# Patient Record
Sex: FEMALE | Race: WHITE | Employment: FULL TIME | URBAN - METROPOLITAN AREA
[De-identification: names, ages, dates, MRNs, and addresses within clinical notes are randomized per-mention and may not be internally consistent; named-entity substitution may affect disease eponyms.]

---

## 2020-04-02 ENCOUNTER — HOSPITAL ENCOUNTER (OUTPATIENT)
Dept: GENERAL RADIOLOGY | Age: 35
Discharge: HOME | End: 2020-04-02
Attending: FAMILY MEDICINE
Payer: COMMERCIAL

## 2020-04-02 DIAGNOSIS — R05.9 COUGH: ICD-10-CM

## 2020-04-02 DIAGNOSIS — R06.02 SHORTNESS OF BREATH: ICD-10-CM

## 2020-04-02 PROCEDURE — 71046 X-RAY EXAM CHEST 2 VIEWS: CPT

## 2022-01-11 ENCOUNTER — HOSPITAL ENCOUNTER (OUTPATIENT)
Dept: PHYSICAL THERAPY | Age: 37
Discharge: HOME | End: 2022-01-11
Payer: COMMERCIAL

## 2022-01-11 VITALS — HEIGHT: 67 IN | WEIGHT: 264 LBS | BODY MASS INDEX: 41.44 KG/M2

## 2022-01-11 PROCEDURE — 97161 PT EVAL LOW COMPLEX 20 MIN: CPT

## 2022-01-11 PROCEDURE — 97110 THERAPEUTIC EXERCISES: CPT

## 2022-01-11 PROCEDURE — 97140 MANUAL THERAPY 1/> REGIONS: CPT

## 2022-01-11 NOTE — PROGRESS NOTES
[facility]  Lymphedema Clinic  [address] [facility], [address] [place] [address], [facility] 22.    INITIAL EVALUATION    NAME: Carlie Donahue AGE: 39 y.o. GENDER: female  DATE: 1/11/2022  REFERRING PHYSICIAN: Frida Grey NP  HISTORY AND BACKGROUND:   Primary Diagnosis:  · Primary lymphedema (Q82.0)  Other Treatment Diagnoses:   Swelling not relieved by elevation (R60.9)   Morbid obesity (E66.01)  Date of Onset: 10/21/2021  Present Symptoms and Functional Limitations: Patient reports onset of left foot and ankle swelling after left foot injury in ( stepped the wrong way) in 2019. Patient reports she went to therapy and pain went away but swelling remained unchanged. She then developed significantly increased swelling with pregnancy in 5671-1410. Both legs swelled at that time but the left was dramatically worse than the right. She reports that the swelling was so bad that she had to wear a Men's sandal on her left foot. Patient delivered her daughter about 8 months ago and reports swelling improved after that but left leg has remained swollen especially in the foot and ankle area. Patient reports she experiences heaviness and aching pain in her left lower leg that is constant. She reports her father has a history of leg swelling with wounds and recurrent cellulitis and she has a [employer] on her mother's side who used to wear compression garments. Patient tried self purchased compression stockings in the past but it was painful and may not have fit her well. She is not currently utilizing any compression products. Lymphedema Life Impact Scale (LLIS):  Scores 33 out 64 of with 49% impairment.   Past Medical History: Sjogren's disease   Past Medical History:   Diagnosis Date    ADD (attention deficit disorder)      Past Surgical History:   Procedure Laterality Date    HX WISDOM TEETH EXTRACTION       Current Medications:  Patient reports she is taking no medications currently. She takes Advil as needed for pain relief. Current Outpatient Medications   Medication Sig    Cetirizine (ZYRTEC) 10 mg Cap Take  by mouth daily.  triamcinolone (NASACORT AQ) 55 mcg nasal inhaler 2 Sprays by Both Nostrils route daily.  naproxen sodium (ALEVE) 220 mg Cap Take  by mouth. 2 daily prn for back pain     ibuprofen (MOTRIN) 200 mg tablet Take  by mouth. 4 daily prn for headaches     multivitamin (ONE A DAY) tablet Take 1 Tab by mouth daily.  meclizine (ANTIVERT) 25 mg tablet Take 1 Tab by mouth three (3) times daily as needed for Dizziness.  fluticasone (FLONASE) 50 mcg/Actuation nasal spray 2 Sprays by Nasal route daily. 1 spray in each nostril. No current facility-administered medications for this encounter. Allergies:  Cat dander, dust mites and seasonal allergies    Prior Level of Function/Social/Work History/Personal factors and/or comorbidities impacting plan of care: Patient works full time as a . . She is  and they have an 7 month old daughter Maru Whitehead. Patient walks 20 minutes a day with her daughter. Living Situation:Multi-level home    Trainable Caregiver?: Yes, wife if available  Mobility: Independent gait without any assistive device for community distances  Sleeping Arrangement:  in bed  Adaptive Equipment Owned: none     Previous Therapy:  Patient had traditional physical therapy for left foot/ankle injury in the past.  No previous lymphedema treatment is noted. Compression/Lymphedema Equipment: None currently    SUBJECTIVE: I injured my foot in 2019 and I had swelling then, but it never improved with therapy. The rest of the injury got better but the swelling was still present. When I was pregnant I had a lot of leg swelling but my left leg was dramatically worse than right. After I had Maru Whitehead, the swelling did improve but the left leg stays swollen especially in my foot. Patients goals for therapy:   To be able to wear shoes again, to have less discomfort and to be able to manage the swelling  OBJECTIVE DATA SUMMARY:   EXAMINATION/PRESENTATION/DECISION MAKING:     Pain:  Pain Scale 1: Numeric (0 - 10)  Pain Intensity 1: 6  Pain Location 1: Foot;Leg  Pain Orientation 1: Left  Pain Description 1: Aching;Heaviness  Patient Stated Pain Goal: 0    Self-Care and ADLs: Independent    Skin and Tissue Assessment:  Dermal Status: Intact, Dry and Flaky    Texture/Consistency: Boggy with mild brawniness to dorsum of left foot    Pigmentation/Color Change: Normal    Anomalies: N/A    Circulatory: Pulses    Nails: Normal    Stemmers Sign: Positive left leg    Height:  Height: 5' 7\" (170.2 cm)  Weight:  Weight: 119.7 kg (264 lb)   BMI:  BMI (calculated): 41.3  (36 or greater: adversely affecting lymphedema)  Wound/Ulcer:  N/A        Volumetric Measurements:   Right:  13,745.46mL Left:  14,123.73mL   % Difference: 2.75% left larger than right Dominance: not assessed   (See scanned graph)    Range of Motion: [facility] for bilateral lower extremities  Strength: Not formally assessed 2* patient is able to complete all functional activities in the clinic today. Sensation:  Intact     Mobility:    Bed/Chair Mobility: Independent  Transfers: Independent  Gait: Independent  Endurance: good    Safety:  Patient is alert and oriented: yes  Safety awareness: good  Fall risk?: low  Patient given written fall prevention handout: Yes    Based on the above components, the patient evaluation is determined to be of the following complexity level: LOW     Evaluation Time:  5196-2091 35 minutes    TREATMENT PROVIDED:   1. Treatment description:  Therapeutic Exercise and Procedure: Patient instructed in activity restrictions and exercise guidelines. Therapist demonstrated deep abdominal breathing and a remedial lymphedema range of motion exercise program to be done in sitting.   Patient was able to complete the routine times 5 reps and deep abdominal breathing with good performance. Patient has been asked to complete breathing exercises and the decongestive exercise routine daily. Patient does participate in a walking or  program almost daily and she is encouraged to try to walk 30 minutes a day. Treatment time:  1515-9163 Minutes: 10    2. Treatment description:  Manual Therapy: Patient instructed in skin care principles and anatomy of the lymphatic system. Therapist able to educate patient in the basic concepts of manual lymphatic drainage techniques. Educated patient in skin care with low pH lotion and educated patient how to care for skin injuries to promote healing and prevent infection. Her skin is dry and she is using a lotion daily but she will check it to see if dimethicone is present. Educated patient in the signs and symptoms of cellulitis and to contact her physician if symptoms are present. Educated patient in complete decongestive therapy. Discussed treatment components and goals with patient and the role of multi-layer compression bandaging to decrease swelling and compression garments to manage swelling over time. Discussed the availability of day and night compression garments. Patient would like to try and learn multi-layer compression bandaging for long term management and is agreeable to initiate treatment later this week. She is educated in the need to purchase bandaging supplies and to obtain a shoe 2 sizes larger than her current shoe size. Bandaging order will be sent to [employer]. Demonstrated bandaging components for patient. Discussed foot wear with patient and encouraged her to wear sneakers ( which she is wearing today) or a full coverage shoe for her foot. Discussed kinesiotape use with patient and the possibly application on her left foot to help address foot swelling.   Patient was agreeable to try this option so therapist applied skin barrier followed a sample piece of kinesiotape to medial aspect of left forearm and educated patient in how to monitor for adverse skin reactions. If an adverse reaction occurs she is to remove the tape, if none is noted she is to keep the tape on until tomorrow and removed the tape by applying vegetable oil and allowing it to soak in before tape is removed. She is to then again monitor for adverse skin reactions. Patient verbalized understanding. Treatment time:  6405-9670  Minutes: 48    ASSESSMENT:   Todd Hays is a 39 y.o. female who presents with stage 2 lower extremity lymphedema of left lower extremity and to a lesser extent the right lower extremity and her lymphedema presents as a primary in nature. Pain is present in the left lower extremity and tissue texture changes are present in the left foot. Patient is motivated to improve her condition. Patient's condition is complicated by Sjogren's disease. Patient will benefit from complete decongestive therapy (CDT) including: Manual lymphatic drainage (MLD) technique, Short-stretch textile bandages/compression system to decongest limb and Kinesiotaping as appropriate. This care is medically necessary due to the infection risk with lymphedema and to improve functional activities. CDT is necessary to resolve swelling to allow patient to return to wearing normal clothes/footwear and prevent worsening of symptoms, such as infections and/or hospitalizations. Patient will be independent with home program strategies to allow improved ADL ability and mobility and to allow patient to return to greatest functional independence. Rehabilitation potential is considered to be Good. Factors which may influence rehabilitation potential include patient's age and she is eager to learn how to manage and control this condition. Patient will benefit from 10-12 physical therapy visits over 10-12 weeks to optimize improvement in these areas.     PLAN OF CARE:   Recommendations and Planned Interventions: Manual lymph drainage/decongestive therapy, Multi-layer compression bandaging (short-stretch), Compression garment fitting/provision, Lymphedema therapeutic exercise, Self-care training, Functional mobility training, Education in skin care and lymphedema precautions, Self-MLD education per home program, Self-bandaging education per home program and Caregiver education as needed    GOALS  Short term goals  Time frame: 2/15/2022  1. Patient will demonstrate knowledge of signs/symptoms of infections/cellulitis and be independent in skin care to prevent cellulitis. 2.  Patient will demonstrate independence in lymphedema home program of therapeutic exercises to improve circulation and decongest limb to improve ADLs. 3.  Patient will tolerate multi-layer bandages (MLB) and show measureable decrease in limb volume to allow ordering of home compression system (daytime, nighttime garments and pump as needed). Long term goals  Time frame: 4/7/2022  1. Pt will show improvement in the Lymphedema Life Impact Scale (LLIS) by decreasing the score to 40% and thus allow improvement in patient's quality of life. 2.  Patient will be independent with don/doff of compression system and use in order to prevent reaccumulation of fluid at discharge. 3.  Pt will be independent in self-MLD and show stable limb volumes showing decongestion and pt. ready for transition to independent restorative phase of lymphedema therapy. Patient has participated in goal setting and agrees to work toward plan of care. Patient was instructed to call if questions or concerns arise. Thank you for this referral.   Veronica Alexis, MSPT, CLT-MIMI           Total timed codes: 60  1:1 Treatment time: 60    TREATMENT PLAN EFFECTIVE DATES:   1/11/2022 TO 4/7/2022  I have read the above plan of care for Zheng Harris. I certify the above prescribed services are required by this patient and are medically necessary.   The above plan of care has been developed in conjunction with the lymphedema/physical therapist.       Physician Signature: ____________________________________Date:______________                                           Adriane Valero NP

## 2022-01-14 ENCOUNTER — HOSPITAL ENCOUNTER (OUTPATIENT)
Dept: PHYSICAL THERAPY | Age: 37
Discharge: HOME | End: 2022-01-14
Payer: COMMERCIAL

## 2022-01-14 PROCEDURE — 97140 MANUAL THERAPY 1/> REGIONS: CPT

## 2022-01-14 NOTE — PROGRESS NOTES
LYMPHEDEMA PT DAILY TREATMENT NOTE - [facility] 2-15    Patient Name: Sangeeta Hill  Date:2022  : 1985  [x]  Patient  Verified  Payor: Kareem Liz / Plan:  [address] / Product Type: HMO /    In time:8:45am Out time:10:15am  Total Treatment Time (min): 90  Total Timed Codes (min): 90  1:1 Treatment Time ( [address] only): 90  Visit #:  2    Treatment Area: Lymphedema, not elsewhere classified [I89.0]    SUBJECTIVE  Pain Level (0-10 scale): 0/10  Any medication changes, allergies to medications, adverse drug reactions, diagnosis change, or new procedure performed?: [x] No    [] Yes (see summary sheet for update)  Subjective functional status/changes:   [x] No changes reported  Patient reports that she is ready to begin bandaging today. Patient brought in larger shoes, but they were not wide enough for bandage so a cast shoe was utilized today. OBJECTIVE  90 min Manual Therapy    Kinesiotaping: Patient education provided on proper removal of kinesiotape. Patient did not have reaction to test strip from evaluation. Today able to place oval piece of tape at the base of toes on the dorsum of the left foot. Manual Lymphatic Drainage (MLD):  Area to decongest: LE: bilateral   Sequence used and effectiveness: MLD principles with skin care today. Session today focused on MLB and having patient be able to demonstrate self MLB. Skin/wound care/debridement: Reviewed skin care principles:   Patient's extremity bathed with soap and water. Performed skin care with low pH lotion following manual lymph principles. Skin care products  Hygiene  Prevention of cellulitis    Applied multi-layer compression bandaging to: Patient ready to learn how to bandage today.    left  lower extremity: below knee    The following multi-layer bandages were applied:  Tricofix      Padding layer:  Fleece    Foam:  12 cm roll    Short stretch bandages:   8 cm  10 cm  12 cm     Upper/Lower Extremity Compression:   Will assess once swelling is stable. Will work on getting insurance benefit information from vendor for next visit. Rationale: decrease edema  to improve the patients ability to prevent worsening of swelling over time. With   [] TE   [] TA   [x] MT   [] SC   [x] other: Patient Education: [x] Review HEP    [x] MLD Patient Education Continued education in self MLD technique with bathing and skin care. [x] Progressed/Changed HEP based on:   [x] positioning   [x] Kinesiotape   [x] Skin care   [] wound care   [] other:   [x] Patient/caregiver in multi-layer bandaging donning principles. , Precautions., Supply ordering and types of bandaging. , Handout provided. and Further review required. Patient / caregiver re-demonstrated bandaging. [x] Yes  [] No  Compression bandaging/garment precautions reviewed: [x] Yes  [] No     Other Objective/Functional Measures: Will reassess on next visit. Pain Level (0-10 scale) post treatment: 0/10    Weight: 265.6 lbs    ASSESSMENT/Changes in Function:   Patient very eager to learn how to better manage her swelling able was able to begin bandaging today. Patient will work will maintain MLB over the weekend and then reapply MLB as tolerated in the home setting as she was able to demonstrate today that she is able to self bandage adequately. Patient will return for follow up and call the clinic if she has any questions prior to that visit. Patient should progress quickly with treatment and will work on modifying MLB and beginning full MLD next session. Patient will continue to benefit from skilled PT services to  address swelling, instruct in home lymphedema management program, measure for compression products and modify and progress therapeutic interventions to attain remaining goals.      [x]  See Plan of Care  []  See progress note/recertification  []  See Discharge Summary         Progress towards goals / Updated goals:  GOALS  Short term goals  Time frame: 2/15/2022  1. Patient will demonstrate knowledge of signs/symptoms of infections/cellulitis and be independent in skin care to prevent cellulitis. Progressing towards goal.  2.  Patient will demonstrate independence in lymphedema home program of therapeutic exercises to improve circulation and decongest limb to improve ADLs. Progressing towards goal.  3.  Patient will tolerate multi-layer bandages (MLB) and show measureable decrease in limb volume to allow ordering of home compression system (daytime, nighttime garments and pump as needed). Progressing towards goal.     Long term goals  Time frame: 4/7/2022  1. Pt will show improvement in the Lymphedema Life Impact Scale (LLIS) by decreasing the score to 40% and thus allow improvement in patient's quality of life. 2.  Patient will be independent with don/doff of compression system and use in order to prevent reaccumulation of fluid at discharge. 3.  Pt will be independent in self-MLD and show stable limb volumes showing decongestion and pt. ready for transition to independent restorative phase of lymphedema therapy.     PLAN  []  Upgrade activities as tolerated     [x]  Continue plan of care  []  Update interventions per flow sheet       []  Discharge due to:_  []  Other:_      Ander HEART Patch, PTA, CLT 1/14/2022

## 2022-01-24 ENCOUNTER — APPOINTMENT (OUTPATIENT)
Dept: PHYSICAL THERAPY | Age: 37
End: 2022-01-24
Payer: COMMERCIAL

## 2022-02-02 ENCOUNTER — HOSPITAL ENCOUNTER (OUTPATIENT)
Dept: PHYSICAL THERAPY | Age: 37
Discharge: HOME | End: 2022-02-02
Payer: COMMERCIAL

## 2022-02-02 PROCEDURE — 97140 MANUAL THERAPY 1/> REGIONS: CPT

## 2022-02-02 NOTE — PROGRESS NOTES
LYMPHEDEMA PT DAILY TREATMENT NOTE - [facility] 2-15    Patient Name: You Conde  Date:2022  : 1985  [x]  Patient  Verified  Payor: Emerson Brody / Plan: Marlyn Daily / Product Type: HMO /    In time: 9:15am Out time:10:30am  Total Treatment Time (min): 75  Total Timed Codes (min): 75  1:1 Treatment Time ( [address] only): 75  Visit #:  3    Treatment Area: Lymphedema, not elsewhere classified [I89.0]    SUBJECTIVE  Pain Level (0-10 scale): 0/10  Any medication changes, allergies to medications, adverse drug reactions, diagnosis change, or new procedure performed?: [] No    [x] Yes   Contact Dermatitis after last visit. Resolved at this time. Subjective functional status/changes:   [x] No changes reported  Patient reports that she had issues with itchy and burning skin after her first bandage. She went to PCP and was given ointment for contact dermatitis. Patient now with out issues and able to return today after missing last visit due to Covid exposure and awaiting negative Covid test. Patient reports that her L LE swelling improved with last bandage and she was pleased with how her leg looked. She worked on bandaging the R LE in the home setting some due to the issues she had with her skin on the left leg. No concerns with skin integrity today. Patient reports that she needed to leave early for a work meeting so treatment time was shortened to accommodate patient's needs. OBJECTIVE  90 min Manual Therapy    Kinesiotaping: Patient education provided on proper removal of kinesiotape. Patient did not have a reaction to the tape at the base of toes last visit. Today able to apply 2 \"Y\" strips from base of 1st and 2nd toe to above ankle laterally and one from base of 3rd and 4th toes past the ankle medically.       Manual Lymphatic Drainage (MLD):  Area to decongest: LE: bilateral   Sequence used and effectiveness: Secondary sequence for lower extremities with trunk involvement. Patient able to be issued Self MLD packet and able to demonstrate techniques. Patient will begin following handout in the home setting. Skin/wound care/debridement: Reviewed skin care principles:   Performed skin care with low pH lotion following manual lymph principles. Skin care products  Hygiene  Prevention of cellulitis   Applied Aquaphor mixed with Eucerin today prior to [facility] application. Applied multi-layer compression bandaging to:    left  lower extremity: below knee    The following multi-layer bandages were applied:  Tg soft  Transelast (1st and 2nd toes)    Padding layer:  Fleece    Foam:  12 cm roll    Short stretch bandages:   8 cm  10 cm  12 cm    Cast shoe for the left foot. Patient aware to be cautious with gait. Patient aware also to remove MLB if she is having any concerns and to monitor her skin. Upper/Lower Extremity Compression:   Will assess once swelling is stable. Vendor returned insurance benefits and there is no coverage for compression garments. Patient made aware of this and given rough estimates of pricing for garments so that she would be prepared when it was time to order compression garments. Rationale: decrease edema  to improve the patients ability to prevent worsening of swelling over time. With   [x] TE   [] TA   [x] MT   [] SC   [x] other: Patient Education: [x] Review HEP    [x] MLD Patient Education Continued education in self MLD technique with bathing and skin care. [x] Progressed/Changed HEP based on:   [x] positioning   [x] Kinesiotape   [x] Skin care   [] wound care   [] other:   [x] Patient/caregiver in multi-layer bandaging donning principles. , Precautions., Supply ordering and types of bandaging. , Handout provided. and Further review required. Patient / caregiver re-demonstrated bandaging.  [x] Yes  [] No  Compression bandaging/garment precautions reviewed: [x] Yes  [] No     Other Objective/Functional Measures: Reviewed results of volumetric measurements taken on evaluation. -left lower 13,394.52 ml today compared to 14,123.73 ml on 1/11/2022.     -right lower  13,924.76 ml today compared to 13,745.46 ml on 1/11/2022. Percent difference today is -3.81% as compared to 2.75% on evaluation. Noted that full volumes do not take into account patient's foot swelling that currently is of most concern. Will continue to monitor as there was very little change from evaluation girths. Pain Level (0-10 scale) post treatment: 0/10    ASSESSMENT/Changes in Function:   Patient able to return today for the first time since 1/14/2022 for follow up. Patient has made some gains in reduction of swelling bilaterally, but continues to have swelling in the L foot that is most bothersome for patient. Patient did have contact dermatitis after last visit so will need to continue to modify and monitor skin carefully. Able to add Self MLD and exercises to her home program and patient to return for follow up bandaged so we can assess progress. Patient making gains towards goals and is eager to learn how to improve her lymphedema. Will set up a vasopneumatic device demonstration/treatment on an upcoming visit. Patient will continue to benefit from skilled PT services to address swelling, instruct in home lymphedema management program, measure for compression products and modify and progress therapeutic interventions to attain remaining goals. [x]  See Plan of Care  []  See progress note/recertification  []  See Discharge Summary         Progress towards goals / Updated goals:  GOALS  Short term goals  Time frame: 2/15/2022  1. Patient will demonstrate knowledge of signs/symptoms of infections/cellulitis and be independent in skin care to prevent cellulitis.  Progressing towards goal.  2.  Patient will demonstrate independence in lymphedema home program of therapeutic exercises to improve circulation and decongest limb to improve ADLs. Progressing towards goal.  3.  Patient will tolerate multi-layer bandages (MLB) and show measureable decrease in limb volume to allow ordering of home compression system (daytime, nighttime garments and pump as needed). Progressing towards goal.     Long term goals  Time frame: 4/7/2022  1. Pt will show improvement in the Lymphedema Life Impact Scale (LLIS) by decreasing the score to 40% and thus allow improvement in patient's quality of life. 2.  Patient will be independent with don/doff of compression system and use in order to prevent reaccumulation of fluid at discharge. 3.  Pt will be independent in self-MLD and show stable limb volumes showing decongestion and pt. ready for transition to independent restorative phase of lymphedema therapy.  Progressing towards goal.      PLAN  []  Upgrade activities as tolerated     [x]  Continue plan of care  []  Update interventions per flow sheet       []  Discharge due to:_  []  Other:_      Ander HEART Patch, PTA, CLT 2/2/2022

## 2022-02-08 ENCOUNTER — APPOINTMENT (OUTPATIENT)
Dept: PHYSICAL THERAPY | Age: 37
End: 2022-02-08
Payer: COMMERCIAL

## 2022-02-16 ENCOUNTER — HOSPITAL ENCOUNTER (OUTPATIENT)
Dept: PHYSICAL THERAPY | Age: 37
End: 2022-02-16
Payer: COMMERCIAL

## 2022-02-28 ENCOUNTER — HOSPITAL ENCOUNTER (OUTPATIENT)
Dept: PHYSICAL THERAPY | Age: 37
Discharge: HOME | End: 2022-02-28
Payer: COMMERCIAL

## 2022-02-28 PROCEDURE — 97016 VASOPNEUMATIC DEVICE THERAPY: CPT

## 2022-02-28 PROCEDURE — 97140 MANUAL THERAPY 1/> REGIONS: CPT

## 2022-02-28 NOTE — PROGRESS NOTES
LYMPHEDEMA PT DAILY TREATMENT NOTE - [facility] 2-15  30 Day Reassessment   Patient Name: Lázaro Cruz  Date:2022  : 1985  [x]  Patient  Verified  Payor: Rosalina Ga / Plan: Jean Alberto / Product Type: HMO /    In time: 9:15am Out time:10:50am  Total Treatment Time (min): 95  Total Timed Codes (min): 95  1:1 Treatment Time ( [address] only): 95  Visit #:  4    Treatment Area: Lymphedema, not elsewhere classified [I89.0]    SUBJECTIVE  Pain Level (0-10 scale): 0/10  Any medication changes, allergies to medications, adverse drug reactions, diagnosis change, or new procedure performed?: [] No    [x] Yes   See below in subjective section. Subjective functional status/changes:   [x] No changes reported  Patient returned today for the first time since 2022. Patient cancelled two visits on 2022 and 2022. Patient reports since her last visit she has also started outpatient PT for her R knee. She reports that the therapist working on R knee also was working on scar release of her  scar and a portion of the scar opened. Patient had it assessed by her MD and section was glued back and is intact and patient reports no restrictions from her physician. Patient reports that she is eager to to proceed with ordering compression for her L LE today and will then begin bandaging the R LE regularly as she has noticed ncreased swelling on the R LE. Patient reports that she is pleased with her progress since beginning care in the lymphedema clinic. OBJECTIVE  50 min Manual Therapy    Kinesiotaping: Deferred today. Manual Lymphatic Drainage (MLD):  Area to decongest: LE: bilateral   Sequence used and effectiveness: Modifications were made to manual lymph drainage sequence to exclude cervical techniques secondary to patient's age. Secondary sequence for lower extremities with trunk involvement. Patient issued Self MLD packet and able to demonstrate techniques.    Patient performing self MLD in the home setting. Skin/wound care/debridement: Reviewed skin care principles:   Performed skin care with low pH lotion following manual lymph principles. Skin care products  Hygiene  Prevention of cellulitis   Applied Aquaphor mixed with Eucerin today prior to [facility] application. Applied multi-layer compression bandaging to:    left  lower extremity: below knee    The following multi-layer bandages were applied:  Tg soft    Padding layer:  Fleece    Foam:  12 cm roll    Short stretch bandages:   8 cm  10 cm  12 cm    Cast shoe for the left foot. Patient aware to be cautious with gait. Patient aware also to remove MLB if she is having any concerns and to monitor her skin. Patient has a second set of bandages and may begin bandaging the R LE at night as well to begin reduction in order to order garments for her R LE on an upcoming visit, once she receives her L LE garments and can bandage her R LE more consistently. Upper/Lower Extremity Compression:    Measured for the following compression products:    LE: bilateral lower extremity (L LE): Knee high Val Other: Bioflect    Style: Flat-knit    Brand: [employer]  Biaflect    Type: Custom: Elvarex knee high for the left Non-Custom: Size: 2XL     Vendor: [employer]    Education: Educated patient in compression garment donning and doffing. Glove use with donning. Daily wear schedule. Daily laundering. Garment lifespan. Return and reordering process (by bringing prior garments into the clinic). Educated patient to monitor for redness or pressure points on the skin. If new pain occurs they should contact their therapist.  Patient educated in how to don/doff products so that she can work with them when they arrive. Rationale: decrease edema  to improve the patients ability to prevent worsening of swelling over time.       45   Vasopneumatic Device:   CONSERVATIVE TREATMENT:  Patient has tried conservative treatments; Compression bandaging, exercise and elevation :  [x] Yes [] No    If YES, specify type of conservative treatment and duration of use:  Compression garments:      [] <4 weeks   [x] 1-5 months   [] 6-12 months  [] >1 year  Bandaging:     [] <4 weeks   [x] 1-5 months   [] 6-12 months  [] >1 year  Elevation:       [] <4 weeks   [] 1-5 months   [x] 6-12 months  [] >1 year   Exercise:         [] <4 weeks   [x] 1-5 months   [] 6-12 months  [] >1 year   Complete Decongestive Therapy (CDT) [] <4 weeks   [x] 1-5 months   [] 6-12 months  [] >1 year   Instructed on self-manual lymphatic drainage techniques (self-MLD): [x] Yes [] No  Instructed on appropriate skin/nail care practices:    [x] Yes [] No  Evaluation of diet and medications:      [x] Yes [] No  Patient able to have a demonstration today of the Flexitouch Plus device for trunk and B LEs today. Patient responded will to the demonstration/treatment with improved comfort post session and noted reduction in girths. Patient would benefit from this device in the home setting for use during the restorative phase of care to prevent worsening of her condition. MEASUREMENTS:  Affected Side: B LEs (Pre and Post)   Left (cm) Right (cm)   5th Tuberosity 26  26.4 25.6  25.2   Ankle 25.6  25.5 24.8  24.6   Calf 46.4  45.8 48.8  46.5   Mid Knee 46.5  46.4 49.4  47.5   Thigh 82.5  72.2 63  63.5   Groin 75  75.5 75  76.5     Hips  130  128.4   Waist  129  123.4      Rationale: To improve lymphatic fluid movement and decrease swelling to improve the patients ability to perform ADL and IADL skills and prevent worsening of swelling over time. With   [] TE   [] TA   [x] MT   [] SC   [x] other: Patient Education: [x] Review HEP    [x] MLD Patient Education Continued education in self MLD technique with bathing and skin care.    [x] Progressed/Changed HEP based on:   [x] positioning   [x] Kinesiotape   [x] Skin care   [] wound care   [x] other: Vasopneumatic devices [x] Patient/caregiver in multi-layer bandaging donning principles. , Precautions., Supply ordering and types of bandaging. , Handout provided. and Further review required. Patient / caregiver re-demonstrated bandaging. [x] Yes  [] No  Compression bandaging/garment precautions reviewed: [x] Yes  [] No     Other Objective/Functional Measures:   See pre and post vasopneumatic pump treatment girths above. Height: 5'7\" Weight: 265.6 lbs BMI:41.6    Pain Level (0-10 scale) post treatment: 0/10    ASSESSMENT/Changes in Function:   Patient returned today for the first time since 2/2/2022. Patient has cancelled her last two visits since 2/2/2022. Patient has had issues with her R knee requiring outpatient PT to address and works full time so it has been hard for her to come in regularly as hoped. Patient is making progress, but today is just her 4th visit. Patient today was able to meet short term goal #1. All other short term goals are in progress as well as her long term goals. Patient able to be measured for L LE custom knee high and a compression chris garment. Patient plans to work on beginning bandaging at night for B LEs and then order R LE garment on upcoming visit. Patient to follow up when she receives her compression products and come in for assessment of fit and for continued education in her home program.    Patient will continue to benefit from skilled PT services to address swelling, analyze compression product fit and use, instruct in home lymphedema management program, measure for compression products and modify and progress therapeutic interventions to attain remaining goals. [x]  See Plan of Care  []  See progress note/recertification  []  See Discharge Summary         Progress towards goals / Updated goals:  GOALS  Short term goals  Time frame: 2/15/2022  1. Patient will demonstrate knowledge of signs/symptoms of infections/cellulitis and be independent in skin care to prevent cellulitis.  Goal Met 2/28/2022  2. Patient will demonstrate independence in lymphedema home program of therapeutic exercises to improve circulation and decongest limb to improve ADLs. Progressing towards goal. Will extend to 4/7/2022  3. Patient will tolerate multi-layer bandages (MLB) and show measureable decrease in limb volume to allow ordering of home compression system (daytime, nighttime garments and pump as needed). Patient able to tolerate MLB and eager to be measured today for L LE garments. Will extend goal to address R LE and make sure that all needed products are ordered. Long term goals  Time frame: 4/7/2022   1. Pt will show improvement in the Lymphedema Life Impact Scale (LLIS) by decreasing the score to 40% and thus allow improvement in patient's quality of life. Goal in progress. 2.  Patient will be independent with don/doff of compression system and use in order to prevent reaccumulation of fluid at discharge. Will assess when garments arrive. 3.  Pt will be independent in self-MLD and show stable limb volumes showing decongestion and pt. ready for transition to independent restorative phase of lymphedema therapy.  Progressing towards goal.      PLAN  []  Upgrade activities as tolerated     [x]  Continue plan of care  []  Update interventions per flow sheet       []  Discharge due to:_  []  Other:_      Ander Zabala, VIANEY, CLT 2/28/2022   ANNITA SultanaT, CLMIKHAIL

## 2022-03-07 VITALS — WEIGHT: 265.6 LBS | BODY MASS INDEX: 41.69 KG/M2 | HEIGHT: 67 IN

## 2022-03-22 ENCOUNTER — HOSPITAL ENCOUNTER (OUTPATIENT)
Dept: PHYSICAL THERAPY | Age: 37
Discharge: HOME | End: 2022-03-22
Payer: COMMERCIAL

## 2022-03-22 PROCEDURE — 97140 MANUAL THERAPY 1/> REGIONS: CPT

## 2022-03-22 NOTE — PROGRESS NOTES
LYMPHEDEMA PT DAILY TREATMENT NOTE - [facility] -15  Patient Name: Cherry Butler  Date:3/22/2022  : 1985  [x]  Patient  Verified  Payor: Linda Feldman / Plan: Tara Tavares / Product Type: HMO /    In time:2:20 pm Out time:3:15pm  Total Treatment Time (min): 55  Total Timed Codes (min): 55  1:1 Treatment Time ([facility] only): 54  Visit #:  5    Treatment Area: Lymphedema, not elsewhere classified [I89.0]    SUBJECTIVE  Pain Level (0-10 scale): 0/10  Any medication changes, allergies to medications, adverse drug reactions, diagnosis change, or new procedure performed?: [] No    [x] Yes   See below in subjective section. Subjective functional status/changes:   [x] No changes reported    Patient received her L LE custom knee high and her compression chris. Patient reports that she continues to go to outpatient PT for orthopedic issues R knee and back. Patient reports that she would like to work with her products that were fitted today and return to measure for the R LE garments and additional products at her next visit. OBJECTIVE  55 min Manual Therapy    Kinesiotaping: Deferred today. Manual Lymphatic Drainage (MLD):  Area to decongest: LE: bilateral   Sequence used and effectiveness: Modifications were made to manual lymph drainage sequence to exclude cervical techniques secondary to patient's age. Secondary sequence for lower extremities with trunk involvement. Patient issued Self MLD packet and able to demonstrate techniques. Patient performing self MLD in the home setting. Skin/wound care/debridement: Reviewed skin care principles:   Performed skin care with low pH lotion following manual lymph principles. Skin care products  Hygiene  Prevention of cellulitis    Applied multi-layer compression bandaging to: Encouraged continued bandaging in the home setting in the evenings. Patient has all supplies.     Upper/Lower Extremity Compression:   Fitted for the following compression product today. LE: bilateral lower extremity (L LE): Knee high Val Other: Bioflect    Style: Flat-knit    Brand: [employer]  Biaflect    Type: Custom: Elvarex knee high for the left Non-Custom: Size: 2XL     Vendor: [employer]    Education: Educated patient in compression garment donning and doffing. Glove use with donning. Daily wear schedule. Daily laundering. Garment lifespan. Return and reordering process (by bringing prior garments into the clinic). Educated patient to monitor for redness or pressure points on the skin. If new pain occurs they should contact their therapist.  Patient independent with don/doff of her new compression in the clinic today. Rationale: decrease edema  to improve the patients ability to prevent worsening of swelling over time. With   [] TE   [] TA   [x] MT   [] SC   [x] other: Patient Education: [x] Review HEP    [x] MLD Patient Education Continued education in self MLD technique with bathing and skin care. [x] Progressed/Changed HEP based on:   [x] positioning   [x] Kinesiotape   [x] Skin care   [] wound care   [x] other: Vasopneumatic devices   [x] Patient/caregiver in multi-layer bandaging donning principles. , Precautions., Supply ordering and types of bandaging. , Handout provided. and Further review required. Patient / caregiver re-demonstrated bandaging. [x] Yes  [] No  Compression bandaging/garment precautions reviewed: [x] Yes  [] No      Other Objective/Functional Measures:   Patient arrived without compression or MLB in place. Deferred full volumes today as focus of visit was compression fitting, education and assessment. Will reassess full volumes on next visit.      Affected Side: B LE (L>R)   Left (cm) Right (cm)   5th Tuberosity 26    26.5      Ankle 24.8    25      Calf 48.5    47.5        Pain Level (0-10 scale) post treatment: 0/10    ASSESSMENT/Changes in Function:   Patient is making progress, but today is just her 5th visit since starting at the lymphedema clinic on 1/11/2022. Patient has no coverage for compression garments for her current insurance plan and needed to decongest L LE prior to first order being placed. Patient now with well fitting products for the L LE and a compression chrsi for full coverage. Will need to continue with remaining goals to assess volumes on next visit after wearing her compression consistently and bandaging the R lower leg so she can be measured for compression knee high on next visit. Patient has met short term goals 1+2 and is close to meeting short term goal 3. Long term goal 2 met. Patient will continue to benefit from skilled PT services to address swelling, analyze compression product fit and use, instruct in home lymphedema management program, measure for compression products and modify and progress therapeutic interventions to attain remaining goals. []  See Plan of Care  [x]  See progress note/recertification  []  See Discharge Summary         Progress towards goals / Updated goals:  GOALS  Short term goals  Time frame: 2/15/2022  1. Patient will demonstrate knowledge of signs/symptoms of infections/cellulitis and be independent in skin care to prevent cellulitis. Goal Met 2/28/2022  Short term goals 4/7/2022  2. Patient will demonstrate independence in lymphedema home program of therapeutic exercises to improve circulation and decongest limb to improve ADLs. Goal Met 3/22/2022  3. Patient will tolerate multi-layer bandages (MLB) and show measureable decrease in limb volume to allow ordering of home compression system (daytime, nighttime garments and pump as needed). Extend goal to 4/30/2022. In progress. L LE garment arrived and fitted as well as the compression chris. Patient wants to be measured for the R LE next visit. Patient has to self pay for products. Long term goals  Time frame: 4/7/2022   1.   Pt will show improvement in the Lymphedema Life Impact Scale (LLIS) by decreasing the score to 40% and thus allow improvement in patient's quality of life. Goal in progress. Extend Goal to 2022.  2.  Patient will be independent with don/doff of compression system and use in order to prevent reaccumulation of fluid at discharge. Goal Met 3/22/2022  3. Pt will be independent in self-MLD and show stable limb volumes showing decongestion and pt. ready for transition to independent restorative phase of lymphedema therapy. Progressing towards goal.  Extend Goal to 2022.     PLAN  []  Upgrade activities as tolerated     [x]  Continue plan of care  []  Update interventions per flow sheet       []  Discharge due to:_  [x]  Other:_  Plan of Care needs to be extended will  on 2022 and patient returning for follow up on 2022    Ander Zabala, PTA, CLT 3/22/2022  Job Moran MSPT, CLTDANIELLE

## 2022-04-07 NOTE — PROGRESS NOTES
[facility]  [address] [facility], [address] [place] [address], [facility] [address].    Continued Plan of Care/ Re-certification for Physical or Occupational Therapy Services    Steph Fields1985   Rush Tinsley NP   Provider # [id number]                    Diagnosis: Lymphedema, not elsewhere classified [I89.0]  Onset Date: 10/21/2021  Prior Hospitalization: None noted     Visits from Start of Care: 5     Missed Visits: 3  Start of Care: 1/11/2022  Prior Level of Function: Independent with gait and self care    The Plan of Care and following information is based on the patient's current status:  Goal: 1. Patient will demonstrate knowledge of signs/symptoms of infections/cellulitis and be independent in skin care to prevent cellulitis. Status at last note/certification: Initiated  Current Status: met    Goal: 2. Patient will demonstrate independence in lymphedema home program of therapeutic exercises to improve circulation and decongest limb to improve ADLs  Status at last note/certification: Initiated  Current Status: met    Goal: 3 Patient will tolerate multi-layer bandages (MLB) and show measureable decrease in limb volume to allow ordering of home compression system (daytime, nighttime garments and pump as needed). Status at last note/certification: Initiated  Current Status: not met, Extend goal to 4/30/2022  In progress. L LE garment arrived and fitted as well as the compression chris. Patient wants to be measured for the R LE next visit. Patient has to self pay for products    Goal:  Pt will show improvement in the Lymphedema Life Impact Scale (LLIS) by decreasing the score to 40% and thus allow improvement in patient's quality of life. Status at last note/certification: Initiated  Current Status: not met, in progress extend to 5/30/2022    Goal: Patient will be independent with don/doff of compression system and use in order to prevent reaccumulation of fluid at discharge.    Status at last note/certification: Initiated  Current Status: met    Goal: Pt will be independent in self-MLD and show stable limb volumes showing decongestion and pt. ready for transition to independent restorative phase of lymphedema therapy. Status at last note/certification: Initiated  Current Status: not met, in progress. Extend to 5/30/2022    Key functional changes: Patient has obtained trunk and left lower extremity compression garments and she is independent with garment use and wearing schedule. She is independent with skin care and infection precautions. Left leg limb volume has been decreasing. Pain has decreased    Problems/ barriers to goal attainment: Patient has to self purchase compression products secondary to no insurance coverage     Problem List: edema affecting function and other patient requires additional compression products     Treatment Plan: Therapeutic exercise, Manual therapy, Patient education, Self Care training and Other: measuring and fitting for appropriate compression products     Patient Goal (s) has been updated and includes: an extension on time for goal attainment secondary to some missed visits     Goals for this certification period to be accomplished in 6-8 weeks:    Frequency / Duration: Patient to be seen 1 visit every 2-4 weeks for 6-8 weeks:    Assessment / Recommendations: Continue with treatment. Patient has completed multi-layer compression bandaging on the left leg and has received her left lower extremity and trunk compression products. She is now compression bandaging the right leg and will be measured for right lower extremity compression products when limb volume permits. She will then need to be fitted for products upon delivery. Vasopneumatic device demonstration has been completed and order is in progress. She is independent with exercises and skin care.     Certification Period: 3/22/2022- 6/17/2022     POOJA Lebron, ADARSH 4/7/2022    ________________________________________________________________________  I certify that the above Therapy Services are being furnished while the patient is under my care. I agree with the treatment plan and certify that this therapy is necessary. Y or N I have read the above and request that my patient continue as recommended.   Y or N I have read the above report and request that my patient continue therapy with the following changes/special instructions  Y or N I have read the above report and request that my patient be discharged from therapy    Physician's Signature:_________________ Date:___________Time:__________           Bi Marcum NP

## 2022-04-12 ENCOUNTER — HOSPITAL ENCOUNTER (OUTPATIENT)
Dept: PHYSICAL THERAPY | Age: 37
Discharge: HOME | End: 2022-04-12
Payer: COMMERCIAL

## 2022-04-12 ENCOUNTER — APPOINTMENT (OUTPATIENT)
Dept: PHYSICAL THERAPY | Age: 37
End: 2022-04-12
Payer: COMMERCIAL

## 2022-04-12 PROCEDURE — 97140 MANUAL THERAPY 1/> REGIONS: CPT

## 2022-04-12 NOTE — PROGRESS NOTES
LYMPHEDEMA PT DAILY TREATMENT NOTE - [facility] -15  Patient Name: Brian Judge  Date:2022  : 1985  [x]  Patient  Verified  Payor: Jamshid Posada / Plan: Mecca Cox / Product Type: HMO /    In time:2:30 pm Out time:3:15pm  Total Treatment Time (min): 45  Total Timed Codes (min): 45  1:1 Treatment Time ( [address] only): 39  Visit #:  6    Treatment Area: Lymphedema, not elsewhere classified [I89.0]    SUBJECTIVE  Pain Level (0-10 scale): 0/10  Any medication changes, allergies to medications, adverse drug reactions, diagnosis change, or new procedure performed?: [x] No    [] Yes   Subjective functional status/changes:   [x] No changes reported  Patient reports that she has completed her outpatient PT for her knee issues. Patient reports that recently her household had Norovirus so she has not used her vasopneumatic device. Patient did have the device set up and fitted in the home setting and had training on use. Patient will return to use now that she is feeling better. Patient eager to be measured for her R LE knee high today. Patient reports that her insurance will change starting in May so she will contact the clinic with the new card information so we can check garment benefits. OBJECTIVE  45 min Manual Therapy    Kinesiotaping: Deferred today. Manual Lymphatic Drainage (MLD):  Area to decongest: LE: bilateral   Sequence used and effectiveness: Modifications were made to manual lymph drainage sequence to exclude cervical techniques secondary to patient's age. Secondary sequence for lower extremities with trunk involvement. Patient issued Self MLD packet and able to demonstrate techniques. Patient performing self MLD in the home setting. Received Flexitouch Plus for home use. Skin/wound care/debridement: Reviewed skin care principles:   Performed skin care with low pH lotion following manual lymph principles.    Skin care products  Hygiene  Prevention of cellulitis Applied multi-layer compression bandaging to: Encouraged continued bandaging in the home setting in the evenings. Patient has all supplies. Upper/Lower Extremity Compression:   Patient did not wear in any of her compression products today. She reports wearing daily. Fitted for the following compression product today. LE: bilateral lower extremity (L LE): Knee high Val Other: Bioflect    Style: Flat-knit    Brand: Resonant [employer]  Biaflect    Type: Custom: Elvarex knee high for the left Non-Custom: Size: 2XL     Vendor: [employer]    Education: Educated patient in compression garment donning and doffing. Glove use with donning. Daily wear schedule. Daily laundering. Garment lifespan. Return and reordering process (by bringing prior garments into the clinic). Educated patient to monitor for redness or pressure points on the skin. If new pain occurs they should contact their therapist.  Patient independent with don/doff of her new compression in the clinic today. Today measured for R LE custom Elvarex knee high and order will be sent to [employer] for processing. Would benefit from night time foam systems when patient about to obtain them, current insurance has no coverage for compression garments so she is purchasing products as self pay from vendor. Rationale: decrease edema  to improve the patients ability to prevent worsening of swelling over time. With   [] TE   [] TA   [x] MT   [] SC   [x] other: Patient Education: [x] Review HEP    [x] MLD Patient Education Continued education in self MLD technique with bathing and skin care. [x] Progressed/Changed HEP based on:   [x] positioning   [x] Kinesiotape   [x] Skin care   [] wound care   [x] other: Vasopneumatic devices   [x] Patient/caregiver in multi-layer bandaging donning principles. , Precautions., Supply ordering and types of bandaging. , Handout provided. and Further review required. Patient / caregiver re-demonstrated bandaging. [x] Yes  [] No  Compression bandaging/garment precautions reviewed: [x] Yes  [] No      Other Objective/Functional Measures:   Reviewed results of volumetric measurements taken on evaluation. -left lower 14,123.73 ml today compared to 12,886.46 ml on 1/11/2022.     -right lower  14,231.26 ml today compared to 13,745.46 ml on 1/11/2022. Percent difference today is -9.75% as compared to 2.75% on evaluation. Weight: 261.4 lbs Height: 5'7\" BMI: 40.9     (36 or greater: adversely affecting lymphedema)    Pain Level (0-10 scale) post treatment: 0/10    ASSESSMENT/Changes in Function:   Patient is making progress towards goals, but has had difficulty with coming to the lymphedema clinic consistently due to her work schedule, other outpatient PT and caring for her child since evaluation on 1/11/2022. Patient today was measured for her custom R LE garment. Patient hopeful that when she gets new insurance coverage in May that they may provide coverage for her compression garments as she would benefit from two sets of day time garments and night time foam garments to better manage her swelling during the restorative phase of care. Patient to focus on consistent compression use and return mid May for follow up. Patient has met all short term goals. Long term goal 2 met and long term goal 1+3 close to being met. Once all compression in place and deemed effective patient should be ready for discharge to the restorative phase of care. Patient will continue to benefit from skilled PT services to address swelling, analyze compression product fit and use, instruct in home lymphedema management program, measure for compression products and modify and progress therapeutic interventions to attain remaining goals.      []  See Plan of Care  [x]  See progress note/recertification  []  See Discharge Summary         Progress towards goals / Updated goals:  GOALS  Short term goals  Time frame: 2/15/2022  1. Patient will demonstrate knowledge of signs/symptoms of infections/cellulitis and be independent in skin care to prevent cellulitis. Goal Met 2/28/2022  Short term goals 4/7/2022  2. Patient will demonstrate independence in lymphedema home program of therapeutic exercises to improve circulation and decongest limb to improve ADLs. Goal Met 3/22/2022  Short term goal extended to 4/30/2022  3. Patient will tolerate multi-layer bandages (MLB) and show measureable decrease in limb volume to allow ordering of home compression system (daytime, nighttime garments and pump as needed). Goal Met 4/12/2022    Long term goals  Time frame: 4/7/2022   2. Patient will be independent with don/doff of compression system and use in order to prevent reaccumulation of fluid at discharge. Goal Met 3/22/2022  Long term goals extended to 5/30/2022  1. Pt will show improvement in the Lymphedema Life Impact Scale (LLIS) by decreasing the score to 40% and thus allow improvement in patient's quality of life. Goal in progress. 3.  Pt will be independent in self-MLD and show stable limb volumes showing decongestion and pt. ready for transition to independent restorative phase of lymphedema therapy. Goal in progress. Patient's left leg volumes much improved. Measured compression for the R LE today. Will reassess volumes on next visit.       PLAN  []  Upgrade activities as tolerated     [x]  Continue updated plan of care  []  Update interventions per flow sheet       []  Discharge due to:_  []  Other:_     Ander Zabala, PTA, CLT 4/12/2022  POOJA Aleman, CLMIKHAIL

## 2022-04-22 VITALS — BODY MASS INDEX: 41.03 KG/M2 | WEIGHT: 261.4 LBS | HEIGHT: 67 IN

## 2022-04-25 NOTE — PROGRESS NOTES
[facility]  a part of [address] [place] [address], [address] [facility], [address]  Phone: [phone]  Fax: [phone]        Progress Note    Name: Cristina Reese   : 1985   MD: Flavio Gaucher, NP       Treatment Diagnosis: Lymphedema, not elsewhere classified [I89.0]  Start of Care: 2022   Visits from Start of Care: 7  Missed Visits: 3    Summary of Care:Patient is making progress towards goals, but has had difficulty with coming to the lymphedema clinic consistently due to her work schedule, other outpatient PT and caring for her child since evaluation on 2022. Patient was measured for her custom R LE garment today and she has compression for the left leg. She is hopeful that when she gets new insurance coverage in May that they may provide coverage for her compression garments as she would benefit from two sets of day time garments and night time foam garments to better manage her swelling during the restorative phase of care. Patient to focus on consistent compression use and return mid May for follow up. Once all compression in place and deemed effective patient should be ready for discharge to the restorative phase of care. She has been educated in self manual lymph drainage, exercise, skin care and compression garment use. Patient will continue to benefit from skilled PT services to address swelling, analyze compression product fit and use, instruct in home lymphedema management program, measure for compression products and modify and progress therapeutic interventions to attain remaining goals. Assessment / Recommendations:   Short term goals  Time frame: 2/15/2022  1.  Patient will demonstrate knowledge of signs/symptoms of infections/cellulitis and be independent in skin care to prevent cellulitis.     Status at last note/certification: Met  Status at progress note: met      Short term goals 2022  2.  Patient will demonstrate independence in lymphedema home program of therapeutic exercises to improve circulation and decongest limb to improve ADLs. Status at last note/certification: met  Status at progress note: met    3.  Patient will tolerate multi-layer bandages (MLB) and show measureable decrease in limb volume to allow ordering of home compression system (daytime, nighttime garments and pump as needed). Goal Met 4/12/2022  Status at last note/certification: In progress  Status at progress note: met       Long term goals  Time frame: 4/7/2022   2.  Patient will be independent with don/doff of compression system and use in order to prevent reaccumulation of fluid at discharge. Goal Met 3/22/2022     Status at last note/certification: Met  Status at progress note: met      1.  Pt will show improvement in the Lymphedema Life Impact Scale (LLIS) by decreasing the score to 40% and thus allow improvement in patient's quality of life. Goal in progress. Long term goal extended to 5/30/2022  Status at last note/certification: In progress  Status at progress note: not met      3.  Pt will be independent in self-MLD and show stable limb volumes showing decongestion and pt. ready for transition to independent restorative phase of lymphedema therapy. Goal in progress. Patient's left leg volumes much improved. Measured compression for the R LE today. Will reassess volumes on next visit. Long term goal extended to 5/30/2022  Status at last note/certification: In progress  Status at progress note: not met       Other: Continue with plan of care with focus on independence with home management routine, education and measuring and fittiing for compression products.        Alka Ruggiero, MSPT, CLT-MIMI 4/25/2022

## 2022-08-08 ENCOUNTER — HOSPITAL ENCOUNTER (OUTPATIENT)
Dept: PHYSICAL THERAPY | Age: 37
Discharge: HOME | End: 2022-08-08
Payer: COMMERCIAL

## 2022-08-08 VITALS — HEIGHT: 67 IN | WEIGHT: 258.2 LBS | BODY MASS INDEX: 40.53 KG/M2

## 2022-08-08 PROCEDURE — 97161 PT EVAL LOW COMPLEX 20 MIN: CPT

## 2022-08-08 PROCEDURE — 97140 MANUAL THERAPY 1/> REGIONS: CPT

## 2022-08-08 NOTE — PROGRESS NOTES
[address] [place] [address]  Lymphedema Clinic  [address] [place] [address], [address], [facility] 22.    INITIAL EVALUATION    NAME: David Porras AGE: 44 y.o. GENDER: female  DATE: 8/8/2022  REFERRING PHYSICIAN: Drew Bowles NP  HISTORY AND BACKGROUND:   Primary Diagnosis:  Primary lymphedema (Q82.0)  Other Treatment Diagnoses:  Swelling not relieved by elevation (R60.9)  Morbid obesity (E66.01)  Date of Onset: 10/21/2021  Present Symptoms and Functional Limitations: Patient reports onset of left foot and ankle swelling after left foot injury in ( stepped the wrong way) in 2019. Patient reports she went to therapy and pain went away but swelling remained unchanged. She then developed significantly increased swelling with pregnancy in 8065-1514. Both legs swelled at that time but the left was dramatically worse than the right. She reports that the swelling was so bad that she had to wear a Men's sandal on her left foot. After patient delivered her daughter swelling improved  but left leg has remained swollen especially in the foot and ankle area. Patient reports she experiences heaviness and aching pain in her left lower leg that is constant. She reports her father has a history of leg swelling with wounds and recurrent cellulitis and she has a [employer] on her mother's side who used to wear compression garments. Patient returns for re-evaluation and reports continued left foot and ankle pain. She does not wear her compression garments consistently due to reports they are hot in the summer heat. She exercises regularly with walking or stationary bike use. She is performing skin care daily. Patient reports she uses her Flexitouch about once a week. She has not self bandaged since she was last seen in the clinic and reports she was never very good at self bandaging.   Patient did not have insurance coverage for compression when she was last seen in therapy and she self purchased flat-knit knee highs and micro massaging chris. She now has a different insurance with possible coverage of products. She is concerned she may be developing some truncal swelling. Lymphedema Life Impact Scale (LLIS):  Scores 23 out 68 of with 39% impairment. Past Medical History: Sjogren's disease   Past Medical History:   Diagnosis Date    ADD (attention deficit disorder)      Past Surgical History:   Procedure Laterality Date    HX WISDOM TEETH EXTRACTION       Current Medications:   See signed list in patient paper chart. Current Outpatient Medications   Medication Sig    Cetirizine (ZYRTEC) 10 mg Cap Take  by mouth daily. triamcinolone (NASACORT AQ) 55 mcg nasal inhaler 2 Sprays by Both Nostrils route daily. naproxen sodium (ALEVE) 220 mg Cap Take  by mouth. 2 daily prn for back pain     ibuprofen (MOTRIN) 200 mg tablet Take  by mouth. 4 daily prn for headaches     multivitamin (ONE A DAY) tablet Take 1 Tab by mouth daily. meclizine (ANTIVERT) 25 mg tablet Take 1 Tab by mouth three (3) times daily as needed for Dizziness. fluticasone (FLONASE) 50 mcg/Actuation nasal spray 2 Sprays by Nasal route daily. 1 spray in each nostril. No current facility-administered medications for this encounter. Allergies:  Cat dander, dust mites and seasonal allergies    Prior Level of Function/Social/Work History/Personal factors and/or comorbidities impacting plan of care: Patient works full time as a . . She is  and they have approximately a one year old daughter Koffi Chen. Patient walks or exercises on stationary bike regularly.     Living Situation:Multi-level home    Trainable Caregiver?: Yes, wife if available  Mobility: Independent gait without any assistive device for community distances  Sleeping Arrangement:  in bed  Adaptive Equipment Owned: none     Previous Therapy:  Patient had traditional physical therapy for left foot/ankle injury in the past.  [facility] from  1/11/2022 to 4/12/2022  Compression/Lymphedema Equipment:  Custom: Elvarex knee high for the left and right, Bioflect Val Size: 2XL, Flexitouch, Multi-layer compression bandaging supplies    SUBJECTIVE: I need to tell you that I haven't been using my tools and compression like I am supposed to. It has been really hot and hard to tolerate the stockings. I do have new insurance and am wondering if that will allow me to get new products. I am feeling like the Flexitouch makes my legs swell more. Patients goals for therapy: To be able to wear shoes again, to have less discomfort and to be able to manage the swelling  OBJECTIVE DATA SUMMARY:   EXAMINATION/PRESENTATION/DECISION MAKING:     Pain:  Patient reports pain level currently of 3/10 numeric scale with aching, heaviness, and tightness and this increases to 6 out of 10 numeric scale at the highest.         Self-Care and ADLs: Independent    Skin and Tissue Assessment:  Dermal Status: Intact    Texture/Consistency: Boggy with mild brawniness to dorsum of left foot    Pigmentation/Color Change: Normal    Anomalies: N/A    Circulatory: Pulses, no history of DVT    Nails: Normal    Stemmers Sign: Positive left leg    Height:  Height: 5' 7\" (170.2 cm)  Weight:  Weight: 117.1 kg (258 lb 3.2 oz)   BMI:  BMI (calculated): 40.4  (36 or greater: adversely affecting lymphedema)  Wound/Ulcer:  N/A        Volumetric Measurements:   Right:  13,432. 17 mL ( decreased by 799.09ml since last assessment 4/12/2022) Left:  12,646.65mL (decreased by 239.81ml since last assessment 4/12/2022)   % Difference:  5.85% left smaller than right versus 9.75% right larger than left on 4/12/2022 Dominance: not assessed   (See scanned graph)    Range of Motion: [facility] for bilateral lower extremities  Strength: Not formally assessed 2* patient is able to complete all functional activities in the clinic today.   Sensation:  Intact     Mobility:    Bed/Chair Mobility: Independent  Transfers: Independent  Gait: Independent  Endurance: good    Safety:  Patient is alert and oriented: yes  Safety awareness: good  Fall risk?: low      Evaluation Time:  3967-5942 25 minutes    TREATMENT PROVIDED:   1. Treatment description:  Therapeutic Exercise and Procedure:   Patient does participate in a walking or stationary bike program almost daily. Reminded patient of the role of muscle pump and the benefit of exercise with compression in place. Treatment time: N/A   Minutes: 0    2. Treatment description:  Manual Therapy:  Reviewed skin care principles with low pH lotion applied following manual lymph drainage principles. Patient is performing skin care with low pH lotion Cerave and Moira cream daily. Reviewed the signs and symptoms of cellulitis and to contact her physician if symptoms are present. Reviewed home program and frequency of use for compression garments (daily) Flexitouch (at least 4-5 days a week). Discussed that her limb volumes are improved today despite her not being fully compliant with home program.  Addressed concerns about possible increased swelling with Flexitouch use and discussed the Flexitouch is meant to be used in conjunction with compression garments which will maintain the treatment results. Without consistent use of any of her compression tools, it is difficult to assess effectiveness of Flexitouch and current compression garments. Reviewed the role of each of her home management tools. Discussed and demonstrated options for compression stockings that may be cooler such as Juzo silver garments. Discussed that we could order these garments with her new insurance. Discussed and demonstrated the availability of night compression garments which patient was unable to consider previously due to expense and the need to self purchase. She may now be eligible to order products if her insurance will allow.   Demonstrated Mallory Hernandez products. Discussed and demonstrated standard use of product with outer jacket and discussed the possibility of over bandaging which could be done at night as needed or when she is home for more aggressive compression. Educated patient in how bandaging would be done and that it would make self bandaging much easier for her but would allow her to be more aggressive  with her compression when needed. Patient is performing good skin care but is presenting with increasing hypertrophic skin changes at the toes. She may benefit from an over the counter toe compression garment for use with night garment if foot portion cannot be sewn with spacers to better compress each toe. Patient has not been educated in toe bandaging but she reports she had difficulty with self bandaging effectiveness previously and she may not be a good candidate for toe bandaging. She expresses that she would be interested in looking at options for toes on the left foot. Patient would like to work with her current Elvarex garments again before looking to order a different brand. Therapist advised patient to use her products as instructed with daily wear and to increase her frequency of Flexitouch use to at least 4-5 days a week for the week prior to he next visit to allow for better assessment of effectiveness of her current compression tools and to determine what changes are needed. Patient is agreeable to commit to this routine. Therapist will investigate with in-network vendor for the possibility of ordering night compression garments in addition to new daytime products. Treatment time: [address] [facility] Minutes: 60    ASSESSMENT:    Dianne Dobbs is a 44 y.o. female who presents with stage 2 lymphedema bilateral lower extremities and her lymphedema presents as a primary in nature. Pain is present in the left lower extremity and tissue texture changes are present in the left foot. Patient is motivated to improve her condition.   She is due to receive new compression garments and she would benefit from night compression products as well. Patient will benefit from complete decongestive therapy (CDT) including: Manual lymphatic drainage (MLD) technique, Short-stretch textile bandages/compression system to decongest limb and Kinesiotaping as appropriate. This care is medically necessary due to the infection risk with lymphedema and to improve functional activities. CDT is necessary to resolve swelling to allow patient to return to wearing normal clothes/footwear and prevent worsening of symptoms, such as infections and/or hospitalizations. Patient will be independent with home program strategies to allow improved ADL ability and mobility and to allow patient to return to greatest functional independence. Rehabilitation potential is considered to be Good. Factors which may influence rehabilitation potential include patient's age and she is eager to learn how to manage and control this condition. Patient to be seen 1 times per week for 1-2 weeks followed by 1 time a week every 4-6 weeks for 12-16 weeks      Recommendations and Planned Interventions: Manual lymph drainage/decongestive therapy, Multi-layer compression bandaging (short-stretch), Compression garment fitting/provision, Lymphedema therapeutic exercise, Self-care training, Functional mobility training, Education in skin care and lymphedema precautions, Self-MLD education per home program, Self-bandaging education per home program, and Caregiver education as needed    GOALS    Short Term Goals: To be accomplished in 6 weeks:  1. Patient will demonstrate knowledge of signs/symptoms of infections/cellulitis and be independent in skin care to prevent cellulitis. 2.  Patient will demonstrate independence in lymphedema home program of therapeutic exercises to improve circulation and decongest limb to improve ADLs.   3.  Patient will be measured for appropriate day and night compression garments to promote optimal swelling management and prevent worsening over time. Long Term Goals: To be accomplished in 16 weeks:  1. Pt will show improvement in the Lymphedema Life Impact Scale by decreasing the score to 35% and thus allow improvement in patient's quality of life. 2.  Patient will be independent with don/doff of compression system and use in order to prevent reaccumulation of fluid at discharge. 3.  Patient will be independent with self multi-layer bandaging over top of foam based night compression garments for improved self management of swelling. 4.  Pt will be independent in self-MLD and show stable limb volumes showing decongestion and pt. ready for transition to independent restorative phase of lymphedema therapy. Patient has participated in goal setting and agrees to work toward plan of care. Patient was instructed to call if questions or concerns arise.     Thank you for this referral.   POOJA Rodriguez, ADARSH           Total timed codes: 60  1:1 Treatment time: 61

## 2022-08-08 NOTE — PROGRESS NOTES
Statement of Medical Necessity  Page 1 of 2      [place] Pay 1985 Today's Date: 8/8/2022 OZZY: Lifetime   Payor: Jay Jay Davenport / Plan: Dave Jiang PPO / Product Type: PPO /  ME: TBD  Refills: 2                   Diagnosis  []   I97.2 Post-Mastectomy Lymphedema []   I87.2 Venous Insufficiency   []   I89.0 Lymphedema, other secondary  []   I83.019 Venous Stasis Ulcer LE, Right   []   I89.9 Unspecified Lymphatic Disorder []   I83.029 Venous Stasis Ulcer LE, Left   [x]   R60.9 Swelling not relieved by elevation [x]   Q82.0 Hereditary/ Congenital Lymphedema   []   C50.211 Malignant neoplasm of breast, Right []   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   []   C50.212 Malignant neoplasm of breast, Left []   L03.115 LE Cellulitis, Right   []    []   L03.116 LE Cellulitis, Left                                   [place] [employer]    1985  Page 2 of 2    Physician Order for DME for Diagnosis of Lymphedema as Listed on Statement of Medical Necessity, Page 1         Recommended Product:  Units Upper Extremity Rt Lt Units Lower Extremity Rt Lt    Circ-Aid, Ready Wrap, Sigvaris Arm    Inelastic binders (Circ-Aid, Farrow)  []Foot   []Below Knee   []Knee   []Thigh      Circ-Aid Ready Wrap, Sigvaris hand    Bautista Lencho, night use []Full Leg  []Lower Leg      Tribute Arm, night use   3 Tribute, night use  [x]Full Leg  []Lower Leg x x    Bautista Elncho Arm, night use    Cecil Sleeve Leg/ Foot, night use      Gradiant Compression Sleeves & Gloves  []Custom [] RM Arm:  []CCL1 []CCL2 []CCL3  []Custom [] RM Glove: []CCL1 []CCL2 []CCL3     6 Gradient Compression Stockings   [x]Custom  []RM Lower Extremity:   []CCL1       [x]CCL2         []CCL3   [x]Knee       []Thigh        []Waist Length x x    Cecil sleeve arm w/ hand, night use    Tribute Wrap, night use      Compression Bra   2 Gradient compression stocking waist length CCL1 x x    Compression Vest         The above patient was referred for treatment of Lymphedema due to the diagnosis indicated above. Lymphedema is a progressive and chronic condition. It may cause acute infections, muscle atrophy, discomfort, and connective tissue fibrosis with irreversible tissue damage, decreased ROM in the affected limb and diminished functional mobility possibly interfering with independence and ability to work. Recurrent infections and wound complications that commonly occur with Lymphedema often require hospitalization and extensive wound care, thus increasing medical costs. The patient has received complete decongestive therapy which includes manual lymphatic drainage, lymphedema specific exercises, compression bandaging, and hygiene/skin care. Goals of therapy are to reduce the edema and prevent re-accumulation of fluid with its complications. It is medically necessary for this patient to have daytime garments to control this condition. They will need 2 sets (one set to wear and one set to wash for adequate skin care and wearing time). Garments must be replaced every 4-6 months for effectiveness. There are no substitutes available that offer acceptable compression treatment for this Lymphedema patient. If further information is requested, please contact our certified lymphedema therapists at [phone].     [x] Jonah Lloyd, PT, MSPT, CLT-MIMI [] Linda Ames, MS, OTR/L, CLT []  Ildefonso Shultz, PT, CLT [] Jono Gee, PTA, CLT, CSIS    Printed  Provider Name Paulo Elkins NP Provider Signature  Date    Provider NPI

## 2022-08-08 NOTE — PROGRESS NOTES
[facility]  a part of [address] [place] [address] [facility], [address]  Phone: [phone]  Fax: [phone]    Plan of Care/Statement of Necessity for Physical Therapy/Occupational Therapy Services  2-15    Patient name: Vonda Spence  : 1985  Provider#: [id number]  Referral source: Flory Spring NP      Medical/Treatment Diagnosis: Lymphedema, not elsewhere classified [I89.0]     Prior Hospitalization: see medical history     Comorbidities: Sjogren's disease  Prior Level of Function: Independent  Medications: Verified on Patient Summary List  Start of Care: 2022      Onset Date: 10/21/2021   The Plan of Care and following information is based on the information from the initial evaluation. Assessment/ montejo information: Vonda Spence is a 44 y.o. female who presents with stage 2 lymphedema bilateral lower extremities and her lymphedema presents as a primary in nature. Pain is present in the left lower extremity and tissue texture changes are present in the left foot. Patient is motivated to improve her condition. She is due to receive new compression garments and she would benefit from night compression products as well. Patient will benefit from complete decongestive therapy (CDT) including: Manual lymphatic drainage (MLD) technique, Short-stretch textile bandages/compression system to decongest limb and Kinesiotaping as appropriate. This care is medically necessary due to the infection risk with lymphedema and to improve functional activities. CDT is necessary to resolve swelling to allow patient to return to wearing normal clothes/footwear and prevent worsening of symptoms, such as infections and/or hospitalizations. Patient will be independent with home program strategies to allow improved ADL ability and mobility and to allow patient to return to greatest functional independence.     Evaluation Complexity History MEDIUM  Complexity : 1-2 comorbidities / personal factors will impact the outcome/ POC ; Examination LOW Complexity : 1-2 Standardized tests and measures addressing body structure, function, activity limitation and / or participation in recreation  ;Presentation LOW Complexity : Stable, uncomplicated  ;Clinical Decision Making Other outcome measures LLIS scores 39% impairment  LOW   Overall Complexity Rating: LOW     Problem List: pain affecting function, edema affecting function, and other skin changes, patient requires new compression garments to manage her swelling and prevent worsening    Treatment Plan may include any combination of the following: Therapeutic exercise, Manual therapy, Patient education, Self Care training, and Other: measuring and fitting for compression garments, multi-layer bandaging education  Patient / Family readiness to learn indicated by: asking questions, trying to perform skills, and interest  Persons(s) to be included in education: patient (P)  Barriers to Learning/Limitations: yes;  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 Patient will demonstrate knowledge of signs/symptoms of infections/cellulitis and be independent in skin care to prevent cellulitis. 2.  Patient will demonstrate independence in lymphedema home program of therapeutic exercises to improve circulation and decongest limb to improve ADLs. 3.  Patient will be measured for appropriate day and night compression garments to promote optimal swelling management and prevent worsening over time. Long Term Goals: To be accomplished in 16 weeks:  1.   Pt will show improvement in the Lymphedema Life Impact Scale by decreasing the score to 35% and thus allow improvement in patient's quality of life. 2.  Patient will be independent with don/doff of compression system and use in order to prevent reaccumulation of fluid at discharge. 3.  Patient will be independent with self multi-layer bandaging over top of foam based night compression garments for improved self management of swelling. 4.  Pt will be independent in self-MLD and show stable limb volumes showing decongestion and pt. ready for transition to independent restorative phase of lymphedema therapy. Frequency / Duration: Patient to be seen 1 times per week for 1-2 weeks followed by 1 time a week every 4-6 weeks for 12-16 weeks      Patient/ Caregiver education and instruction: self care and other compression garment options, self management routine    [x]  Plan of care has been reviewed with PTA        Certification Period: 8/8/2022 to 12/31/2022     POOJA Yarbrough, ERINT-MIMI 8/8/2022     ________________________________________________________________________    I certify that the above Therapy Services are being furnished while the patient is under my care. I agree with the treatment plan and certify that this therapy is necessary.     Physician's Signature:____________________  Date:____________Time: _________         Ameya Reyes NP

## 2022-08-22 ENCOUNTER — HOSPITAL ENCOUNTER (OUTPATIENT)
Dept: PHYSICAL THERAPY | Age: 37
Discharge: HOME | End: 2022-08-22
Payer: COMMERCIAL

## 2022-08-22 PROCEDURE — 97140 MANUAL THERAPY 1/> REGIONS: CPT

## 2022-08-22 NOTE — PROGRESS NOTES
LYMPHEDEMA PT DAILY TREATMENT NOTE - [facility] -15    Patient Name: Magi Tena  Date:2022  : 1985  [x]  Patient  Verified  Payor: Eugenia Floor / Plan: Hemalatha Krishnamurthy PPO / Product Type: PPO /    In time:11:10 am  Out time:12:20 pm   Total Treatment Time (min): 70  Total Timed Codes (min): 70  Visit #: 2    Treatment Area: Lymphedema, not elsewhere classified [I89.0]    SUBJECTIVE  Pain Level (0-10 scale): 0/10  Any medication changes, allergies to medications, adverse drug reactions, diagnosis change, or new procedure performed?: [x] No    [] Yes (see summary sheet for update)  Subjective functional status/changes:   [x] No changes reported  Patient reports that she was on her feet for 5 hours yesterday without compression in place cleaning her home. Patient wanted to proceed with measuring for compression today so that she can get the process started as she is concerned about her increased swelling. Patient reports that she has been elevating her legs at the end of the day and this has been helpful. She feels her current L knee high is difficult to wear because it feels tight in the calf due the increased swelling. OBJECTIVE  70  min Manual Therapy    Kinesiotaping: Deferred        Manual Lymphatic Drainage (MLD):  Area to decongest: LE: bilateral   Sequence used and effectiveness: Modifications were made to manual lymph drainage sequence to exclude cervical techniques secondary to patient's age. Secondary sequence for lower extremities with trunk involvement. Encouraged Self MLD techniques with skin care. Patient has a Flexitouch Plus vasopneumatic device that she is underutilizing in the home setting. Discussed improving frequency of use. Skin/wound care/debridement: Reviewed skin care principles:   Skin care products  Hygiene  Prevention of cellulitis   Applied multi-layer compression bandaging to: Has supplies in the home setting, but is not bandaging on regular basis.    Would benefit from bringing in her bandage supplies for review of proper techniques. Upper/Lower Extremity Compression:   Patient arrived today with out compression in place. Measured for the following compression products:    LE: bilateral bilateral lower extremity: Knee high Top band silicone border Closed toe Night garment    Style: Flat-knit and Foam based with adjustable panels. Brand: [employer]x  [employer]  [employer]    Type: Custom: Flat knit knee highs and night products to the knee per patient request Non-Custom: Size: Patient has option of potential off the shelf night products if she is unable to cover her portion of the custom night  Patient may also self purchase compression capris as she does not utilize them often. Vendor: [address]    Did discuss all styles of compression that would benefit patient's current swelling and she prefers to focus on lower legs at this time as she feels she will be more compliant with those products. Patient also needs to work on improved footwear and wearing shoes in the home for better support and containment of her foot swelling. Education: Educated patient in compression garment donning and doffing. Glove use with donning. Daily wear schedule. Daily laundering. Garment lifespan. Return and reordering process (by bringing prior garments into the clinic). Educated patient to monitor for redness or pressure points on the skin. If new pain occurs they should contact their therapist.    Patient/family demonstrated donning and doffing with independence on prior visits with her products. Rationale: decrease edema  to improve the patients ability to prevent worsening swelling overtime. With   [] TE   [] TA   [x] MT   [] SC   [] other: Patient Education: [x] Review HEP    [x] MLD Patient Education Continued education in self MLD technique with bathing and skin care.    [x] Progressed/Changed HEP based on:   [x] positioning   [] Kinesiotape   [x] Skin care   [] wound care   [x] other: Garment ordering process. []     Patient / caregiver re-demonstrated bandaging. [x] Yes  [] No  Compression bandaging/garment precautions reviewed: [x] Yes  [] No     Other Objective/Functional Measures:     left lower 13,422.72 ml ml today compared to 12,646.65 ml on 8/22/2022. right lower 14,001.24 ml today compared to 13,432.17 ml on 8/22/2022. Percent difference today is -4.13% as compared to -5.85% on evaluation. Pain Level (0-10 scale) post treatment: 0/10    ASSESSMENT/Changes in Function:   Patient returns today with increased swelling and does not have a good grasp of improving her home program and use of her current compression products. Patient would benefit from working on being more consistent with her home routine to see improvements in her swelling. Today able to decide on and be measured for compression products that she feels that she will be most compliant with. These garment measuring forms will be sent to vendor and they will contact patient with her out of pocket costs so that she can proceed with the order as soon as possible. Patient will return in 3 weeks and should have her new products by that time for assessment. Patient was able to meet short term goals 1+3. Patient will continue to benefit from skilled PT services to  address swelling, analyze compression product fit and use, instruct in home lymphedema management program, and measure for compression products to attain remaining goals. []  See Plan of Care  [x]  See progress note/recertification  []  See Discharge Summary         Progress towards goals / Updated goals:  GOALS     Short Term Goals: To be accomplished in 6 weeks:  1. Patient will demonstrate knowledge of signs/symptoms of infections/cellulitis and be independent in skin care to prevent cellulitis. Goal Met 8/22/2022  2.   Patient will demonstrate independence in lymphedema home program of therapeutic exercises to improve circulation and decongest limb to improve ADLs. In progress  3. Patient will be measured for appropriate day and night compression garments to promote optimal swelling management and prevent worsening over time. Goal Met 8/22/2022     Long Term Goals: To be accomplished in 16 weeks:  1. Pt will show improvement in the Lymphedema Life Impact Scale by decreasing the score to 35% and thus allow improvement in patient's quality of life. In progress    2. Patient will be independent with don/doff of compression system and use in order to prevent reaccumulation of fluid at discharge. In progress    3. Patient will be independent with self multi-layer bandaging over top of foam based night compression garments for improved self management of swelling. In progress    4. Pt will be independent in self-MLD and show stable limb volumes showing decongestion and pt. ready for transition to independent restorative phase of lymphedema therapy.   In progress      PLAN  []  Upgrade activities as tolerated     [x]  Continue plan of care  []  Update interventions per flow sheet       []  Discharge due to:_  []  Other:_      Ander Zabala, PTA, CLT  8/22/2022  ANNITA ArzateT, CLMIKHAIL

## 2022-08-25 NOTE — PROGRESS NOTES
[facility]  a part of [address] [place] [address]  [facility] [place], [address] [facility], [address]  Phone: [phone]  Fax: [phone]           Progress Note     Name: Haily Vasquez   : 1985              MD: Ameya Reyes, NP     Treatment Diagnosis: Lymphedema, not elsewhere classified [I89.0]  Start of Care: 2022                        Visits from Start of Care: 2  Missed Visits: 0     Summary of Care:Skin care, Self MLD, vasopneumatic pump use recommendations, exercise, compression garment use and measuring for new compression products. Assessment / Recommendations:   Patient returns today with increased swelling and does not have a good grasp of improving her home program and use of her current compression products. Patient would benefit from working on being more consistent with her home routine to see improvements in her swelling. Today able to decide on and be measured for compression products that she feels that she will be most compliant with. These garment measuring forms will be send to vendor and they will contact patient with her out of pocket costs so that she can proceed with the order as soon as possible. Patient will return in 3 weeks and should have her new products by that time for assessment. Patient was able to met short term goals 1+3. Patient will continue to benefit from skilled PT services to address swelling, analyze compression product fit and use, instruct in home lymphedema management program, and measure for compression products to attain remaining goals. GOALS     Short Term Goals: To be accomplished in 6 weeks:  1. Patient will demonstrate knowledge of signs/symptoms of infections/cellulitis and be independent in skin care to prevent cellulitis. Status at last note/certification:Initiated  Status at progress note: met  2.   Patient will demonstrate independence in lymphedema home program of therapeutic exercises to improve circulation and decongest limb to improve ADLs. Status at last note/certification:Initiated  Status at progress note: not met  3. Patient will be measured for appropriate day and night compression garments to promote optimal swelling management and prevent worsening over time. Status at last note/certification:Initiated  Status at progress note: met        Long Term Goals: To be accomplished in 16 weeks:  1. Pt will show improvement in the Lymphedema Life Impact Scale by decreasing the score to 35% and thus allow improvement in patient's quality of life. Status at last note/certification:Initiated  Status at progress note: not met  2. Patient will be independent with don/doff of compression system and use in order to prevent reaccumulation of fluid at discharge. Status at last note/certification:Initiated  Status at progress note: not met  3. Patient will be independent with self multi-layer bandaging over top of foam based night compression garments for improved self management of swelling. Status at last note/certification:Initiated  Status at progress note: not met  4. Pt will be independent in self-MLD and show stable limb volumes showing decongestion and pt. ready for transition to independent restorative phase of lymphedema therapy. Status at last note/certification:Initiated  Status at progress note: not met        Other: Continue with current treatment following plan of care established on evaluation.          Ander Zabala, PTA, CLT  8/23/2022     Meena ANNITA gordonT, CLTDANIELLE

## 2022-09-12 ENCOUNTER — APPOINTMENT (OUTPATIENT)
Dept: PHYSICAL THERAPY | Age: 37
End: 2022-09-12

## 2022-09-13 ENCOUNTER — APPOINTMENT (OUTPATIENT)
Dept: PHYSICAL THERAPY | Age: 37
End: 2022-09-13
Payer: COMMERCIAL

## 2022-09-20 ENCOUNTER — APPOINTMENT (OUTPATIENT)
Dept: PHYSICAL THERAPY | Age: 37
End: 2022-09-20
Payer: COMMERCIAL

## 2022-10-03 ENCOUNTER — HOSPITAL ENCOUNTER (OUTPATIENT)
Dept: PHYSICAL THERAPY | Age: 37
Discharge: HOME | End: 2022-10-03
Payer: COMMERCIAL

## 2022-10-03 PROCEDURE — 97140 MANUAL THERAPY 1/> REGIONS: CPT

## 2022-10-04 NOTE — PROGRESS NOTES
LYMPHEDEMA PT DAILY TREATMENT NOTE - [facility] -15    Patient Name: Claus Proper  Date:10/3/2022  : 1985  [x]  Patient  Verified  Payor: Imani Simmons / Plan: Terrence Avila PPO / Product Type: PPO /    In time:11:05 am  Out time:12:00 pm   Total Treatment Time (min): 55  Total Timed Codes (min): 55  Visit #: 3    Treatment Area: Lymphedema, not elsewhere classified [I89.0]    SUBJECTIVE  Pain Level (0-10 scale): 0/10  Any medication changes, allergies to medications, adverse drug reactions, diagnosis change, or new procedure performed?: [x] No    [] Yes (see summary sheet for update)  Subjective functional status/changes:   [x] No changes reported  Patient has not completed her compression garment order. She wanted to discuss the garment order today before proceeding due to her cost share. Patient will contact vendor today and get more clarification of her benefits and plans to complete order today. Patient has been exercising and performing yoga. Patient expressed concerns with her truncal/abdominal swelling so discussed improving home program.    OBJECTIVE  55 min Manual Therapy    Kinesiotaping: Deferred        Manual Lymphatic Drainage (MLD):  Area to decongest: LE: bilateral   Sequence used and effectiveness: Modifications were made to manual lymph drainage sequence to exclude cervical techniques secondary to patient's age. Secondary sequence for lower extremities with trunk involvement. Encouraged Self MLD techniques with skin care. Patient has a Flexitouch Plus vasopneumatic device that she is underutilizing in the home setting. Discussed improving frequency of use. Open to having additional settings added to have more choices which may increase compliance. Skin/wound care/debridement: Reviewed skin care principles:   Skin care products  Hygiene  Prevention of cellulitis   Applied multi-layer compression bandaging to: Has supplies in the home setting, but is not bandaging on regular basis.    Would benefit from bringing in her bandage supplies for review of proper techniques. Upper/Lower Extremity Compression:   Patient arrived today with only one of her knee high garments in place. Patient has misplaced the other garment. She reports she has been wearing her compression chris more now that the weather is cooler. Measured for the following compression products on previous visit and rechecked girths today in order to update compression order as patient has not completed it to date. LE: bilateral bilateral lower extremity: Knee high Top band silicone border Closed toe Night garment    Style: Flat-knit and Foam based with adjustable panels. Brand: [employer]    Type: Custom: Flat knit knee highs and night products to the knee per patient request  Patient may also self purchase compression capris as she does not utilize them often. Vendor: [facility] [email or web address]     Did discuss all styles of compression that would benefit patient's current swelling and she prefers to focus on lower legs at this time as she feels she will be more compliant with those products. Patient also needs to work on improved footwear and wearing shoes in the home for better support and containment of her foot swelling. Education: Educated patient in compression garment donning and doffing. Glove use with donning. Daily wear schedule. Daily laundering. Garment lifespan. Return and reordering process (by bringing prior garments into the clinic). Educated patient to monitor for redness or pressure points on the skin. If new pain occurs they should contact their therapist.    Patient/family demonstrated donning and doffing with independence on prior visits with her products. Rationale: decrease edema  to improve the patients ability to prevent worsening swelling overtime.            With   [] TE   [] TA   [x] MT   [] SC   [x] other: Patient Education: [x] Review HEP    [x] MLD Patient Education Continued education in self MLD technique with bathing and skin care. [x] Progressed/Changed HEP based on:   [x] positioning   [] Kinesiotape   [x] Skin care   [] wound care   [x] other: Garment ordering process. Vasopneumatic pump use/settings  [x]     Patient / caregiver re-demonstrated bandaging. [x] Yes  [] No  Compression bandaging/garment precautions reviewed: [x] Yes  [] No     Other Objective/Functional Measures:   left lower 12,888.75 ml today compared to 13,422.72 ml 8/22/2022 compared to 12,646.65 ml on 8/08/2022. right lower 13,162.97 ml today compared to 14,001.24 ml 8/22/2022 compared to 13,432.17 ml on 8/08/2022. Percent difference today is -2.08% as compared to -5.85% on evaluation. Truncal/Abdominal/Hip Girths:  Upper abdomen-104 cm  Mid abdomen-111 cm  Waist-133 cm  Hips-125    Height:  Height: 5' 7\" (170.2 cm)5'7\"  Weight:  Weight: 117.5 kg (259 lb)   BMI:  BMI (calculated): 40.6  (36 or greater: adversely affecting lymphedema)     Pain Level (0-10 scale) post treatment: 0/10    ASSESSMENT/Changes in Function:   Patient returns today for the first time since 8/22/2022. Patient has not completed her compression garment order as she had questions about costs and wanted to return today to discuss her garment selection before completing order. Plan was for patient to have her garments for this visit to progress towards remaining goals. Patient will contact vendor today and complete order and is hopeful that she will have her products by her next visit. Patient would benefit from working on being more consistent/compliant with her home routine to see improvements in her swelling. Patient will return in 2-3 weeks and should have her new products by that time for assessment. Patient was able to meet short term goals 1+3.  Patient will continue to benefit from skilled PT services to  address swelling, analyze compression product fit and use, instruct in home lymphedema management program, and measure for compression products to attain remaining goals. []  See Plan of Care  [x]  See progress note/recertification  []  See Discharge Summary         Progress towards goals / Updated goals:  GOALS     Short Term Goals: To be accomplished in 6 weeks:  1. Patient will demonstrate knowledge of signs/symptoms of infections/cellulitis and be independent in skin care to prevent cellulitis. Goal Met 8/22/2022  2. Patient will demonstrate independence in lymphedema home program of therapeutic exercises to improve circulation and decongest limb to improve ADLs. In progress. 3.  Patient will be measured for appropriate day and night compression garments to promote optimal swelling management and prevent worsening over time. Goal Met 8/22/2022     Long Term Goals: To be accomplished in 16 weeks:  1. Pt will show improvement in the Lymphedema Life Impact Scale by decreasing the score to 35% and thus allow improvement in patient's quality of life. In progress    2. Patient will be independent with don/doff of compression system and use in order to prevent reaccumulation of fluid at discharge. In progress    3. Patient will be independent with self multi-layer bandaging over top of foam based night compression garments for improved self management of swelling. In progress    4. Pt will be independent in self-MLD and show stable limb volumes showing decongestion and pt. ready for transition to independent restorative phase of lymphedema therapy.   In progress      PLAN  []  Upgrade activities as tolerated     [x]  Continue plan of care  []  Update interventions per flow sheet       []  Discharge due to:_  []  Other:_      Himanshu Roof Patch, PTA, CLT  10/3/2022  Efrain Mejia, MSPT, ADARSH

## 2022-10-13 ENCOUNTER — TRANSCRIBE ORDER (OUTPATIENT)
Dept: SCHEDULING | Age: 37
End: 2022-10-13

## 2022-10-13 VITALS — WEIGHT: 259 LBS | HEIGHT: 67 IN | BODY MASS INDEX: 40.65 KG/M2

## 2022-10-13 DIAGNOSIS — M79.621 PAIN IN RIGHT AXILLA: Primary | ICD-10-CM

## 2022-10-17 ENCOUNTER — HOSPITAL ENCOUNTER (OUTPATIENT)
Dept: PHYSICAL THERAPY | Age: 37
Discharge: HOME | End: 2022-10-17
Payer: COMMERCIAL

## 2022-10-17 PROCEDURE — 97140 MANUAL THERAPY 1/> REGIONS: CPT

## 2022-10-17 NOTE — PROGRESS NOTES
[facility]  a part of [address]  [address], [address] [facility], [address]  Phone: [phone]  Fax: [phone]           Progress Note     Name: Larissa Mo   : 1985              MD: Jany Soni, NP     Treatment Diagnosis: Lymphedema, not elsewhere classified [I89.0]  Start of Care: 2022                      Visits from Start of Care: 3  Missed Visits: 1     Summary of Care:Skin care, Self MLD, vasopneumatic pump use recommendations, exercise, compression garment use and measuring for new compression products. Assessment / Recommendations:   Patient returns today for the first time since 2022. Patient has not completed her compression garment order as she had questions about costs and wanted to return today to discuss her garment selection before completing order. Plan was for patient to have her garments for this visit to progress towards remaining goals. Patient will contact vendor today and complete order and is hopeful that she will have her products by her next visit. Patient would benefit from working on being more consistent/compliant with her home routine to see improvements in her swelling for better long term management. Patient will return in 2-3 weeks and should have her new products by that time for assessment. Patient was able to meet short term goals 1+3. Patient will continue to benefit from skilled PT services to  address swelling, analyze compression product fit and use, instruct in home lymphedema management program, and measure for compression products to attain remaining goals. GOALS     Short Term Goals: To be accomplished in 6 weeks:  1. Patient will demonstrate knowledge of signs/symptoms of infections/cellulitis and be independent in skin care to prevent cellulitis. Status at last note/certification:met  Status at progress note: met  2.   Patient will demonstrate independence in lymphedema home program of therapeutic exercises to improve circulation and decongest limb to improve ADLs. Status at last note/certification:not met  Status at progress note: not met in progress  3. Patient will be measured for appropriate day and night compression garments to promote optimal swelling management and prevent worsening over time. Status at last note/certification:met  Status at progress note: met        Long Term Goals: To be accomplished in 16 weeks:  1. Pt will show improvement in the Lymphedema Life Impact Scale by decreasing the score to 35% and thus allow improvement in patient's quality of life. Status at last note/certification:not met  Status at progress note: not met in progress  2. Patient will be independent with don/doff of compression system and use in order to prevent reaccumulation of fluid at discharge. Status at last note/certification:not met   Status at progress note: not met in progress  3. Patient will be independent with self multi-layer bandaging over top of foam based night compression garments for improved self management of swelling. Status at last note/certification:not met   Status at progress note: not met in progress  4. Pt will be independent in self-MLD and show stable limb volumes showing decongestion and pt. ready for transition to independent restorative phase of lymphedema therapy. Status at last note/certification:not met  Status at progress note: not met in progress     Other: Continue with current treatment following plan of care established on evaluation. Assess new compression products when they arrive.      Ander Zabala, PTA , CLT  10/3/2022   Dorothea Gaona, MSPT, CLT-MIMI

## 2022-10-17 NOTE — PROGRESS NOTES
LYMPHEDEMA PT DAILY TREATMENT NOTE - [facility] -15    Patient Name: Aguila Dietz  Date:10/17/2022  : 1985  [x]  Patient  Verified  Payor: Margarito Olsen / Plan: [employer] CARLENE PPO / Product Type: PPO /    In time:10:50 am  Out time:12:15pm   Total Treatment Time (min): 85  Total Timed Codes (min): 85  Visit #: 4    Treatment Area: Lymphedema, not elsewhere classified [I89.0]    SUBJECTIVE  Pain Level (0-10 scale): 0/10  Any medication changes, allergies to medications, adverse drug reactions, diagnosis change, or new procedure performed?: [x] No    [] Yes (see summary sheet for update)  Subjective functional status/changes:   [x] No changes reported  Patient has not completed her compression garment order to date. Patient had additional questions answered today and then is agreeable to call today to complete her compression order. Patient is concerned about continued abdominal/truncal swelling and R LE swelling. L LE had been the more involved limb and now she is concerned that her R LE is getting worse. OBJECTIVE  85 min Manual Therapy    Kinesiotaping: Deferred      Manual Lymphatic Drainage (MLD):  Area to decongest: LE: bilateral   Sequence used and effectiveness: Modifications were made to manual lymph drainage sequence to exclude cervical techniques secondary to patient's age. Secondary sequence for lower extremities with trunk involvement. Encouraged Self MLD techniques with skin care. Patient has a Flexitouch Plus vasopneumatic device that she is underutilizing in the home setting. Discussed improving frequency of use. Open to having additional settings added to have more choices which may increase compliance. Skin/wound care/debridement: Reviewed skin care principles:   Skin care products  Hygiene  Prevention of cellulitis   Applied multi-layer compression bandaging to: Has supplies in the home setting, but is not bandaging on regular basis.    Would benefit from bringing in her bandage supplies for review of proper techniques. Upper/Lower Extremity Compression:   Patient arrived today with only one of her knee high garments in place. Patient has misplaced the other garment. She reports she has been wearing her compression chris more now that the weather is cooler. Measured for the following compression products on previous visit and rechecked girths today in order to update compression order as patient has not completed it to date. LE: bilateral bilateral lower extremity: Knee high Top band silicone border Closed toe Night garment    Style: Flat-knit and Foam based with adjustable panels. Brand: [employer]    Type: Custom: Flat knit knee highs and night products to the knee per patient request  Patient may also self purchase compression capris as she does not utilize them often. Vendor: [facility]    Did discuss all styles of compression that would benefit patient's current swelling and she prefers to focus on lower legs at this time as she feels she will be more compliant with those products. Patient also needs to work on improved footwear and wearing shoes in the home for better support and containment of her foot swelling, which she is willing to do. Education: Educated patient in compression garment donning and doffing. Glove use with donning. Daily wear schedule. Daily laundering. Garment lifespan. Return and reordering process (by bringing prior garments into the clinic). Educated patient to monitor for redness or pressure points on the skin. If new pain occurs they should contact their therapist.    Patient/family demonstrated donning and doffing with independence on prior visits with her products. Rationale: decrease edema  to improve the patients ability to prevent worsening swelling overtime.            With   [] TE   [] TA   [x] MT   [] SC   [x] other: Patient Education: [x] Review HEP    [x] MLD Patient Education Continued education in self MLD technique with bathing and skin care. [x] Progressed/Changed HEP based on:   [x] positioning   [] Kinesiotape   [x] Skin care   [] wound care   [x] other: Garment ordering process. Vasopneumatic pump use/settings  [x]     Patient / caregiver re-demonstrated bandaging. [x] Yes  [] No  Compression bandaging/garment precautions reviewed: [x] Yes  [] No     Other Objective/Functional Measures:   left lower 12,817.31 ml today compared to 12,888.75 ml 10/03/2022 compared to 13,422.72 ml 8/22/2022 compared to 12,646.65 ml on 8/08/2022. right lower 12,910.50 ml today compared to 13,162.97 ml 10/03/2022 compared to 14,001.24 ml 8/22/2022 compared to 13,432.17 ml on 8/08/2022. Percent difference today is -0.72% as compared to -5.85% on evaluation. Truncal/Abdominal/Hip Girths:  Upper abdomen-106 cm  today up from 104 cm 10/03/2022  Mid abdomen-107 cm down today from 111 cm 10/03/2022  Waist-129 cm today down today from 133 cm 10/03/2022  Hips- 132 cm today down today from 125 cm 10/03/2022    Height:  Height: 5' 7\" (170.2 cm)5'7\"  Weight:  Weight: 120.7 kg (266 lb 3.2 oz)   BMI:  BMI (calculated): 41.7  (36 or greater: adversely affecting lymphedema)     Pain Level (0-10 scale) post treatment: 0/10    ASSESSMENT/Changes in Function:   Patient has been more consistent with her home program and is starting to see some improvement in her reduction of swelling. Patient has not been able to complete her compression garment order, but plans to complete today after more discussion of the process in the clinic today. Patient will return in 2-3 weeks and should have her new products by that time for assessment. Patient was able to meet short term goals 1+3. Patient will continue to benefit from skilled PT services to  address swelling, analyze compression product fit and use, instruct in home lymphedema management program, and measure for compression products to attain remaining goals.      []  See Plan of Care  []  See progress note/recertification  []  See Discharge Summary         Progress towards goals / Updated goals:  GOALS     Short Term Goals: To be accomplished in 6 weeks:  1. Patient will demonstrate knowledge of signs/symptoms of infections/cellulitis and be independent in skin care to prevent cellulitis. Goal Met 8/22/2022  2. Patient will demonstrate independence in lymphedema home program of therapeutic exercises to improve circulation and decongest limb to improve ADLs. In progress. 3.  Patient will be measured for appropriate day and night compression garments to promote optimal swelling management and prevent worsening over time. Goal Met 8/22/2022     Long Term Goals: To be accomplished in 16 weeks:  1. Pt will show improvement in the Lymphedema Life Impact Scale by decreasing the score to 35% and thus allow improvement in patient's quality of life. In progress    2. Patient will be independent with don/doff of compression system and use in order to prevent reaccumulation of fluid at discharge. In progress    3. Patient will be independent with self multi-layer bandaging over top of foam based night compression garments for improved self management of swelling. In progress    4. Pt will be independent in self-MLD and show stable limb volumes showing decongestion and pt. ready for transition to independent restorative phase of lymphedema therapy.   In progress      PLAN  []  Upgrade activities as tolerated     [x]  Continue plan of care  []  Update interventions per flow sheet       []  Discharge due to:_  []  Other:_      Ander Zabala, PTA, CLT  10/17/2022

## 2022-11-11 ENCOUNTER — HOSPITAL ENCOUNTER (OUTPATIENT)
Dept: PHYSICAL THERAPY | Age: 37
Discharge: HOME | End: 2022-11-11
Payer: COMMERCIAL

## 2022-11-11 PROCEDURE — 97140 MANUAL THERAPY 1/> REGIONS: CPT

## 2022-11-14 VITALS — WEIGHT: 256.2 LBS | BODY MASS INDEX: 40.21 KG/M2 | HEIGHT: 67 IN

## 2022-11-14 NOTE — PROGRESS NOTES
LYMPHEDEMA PT DAILY TREATMENT NOTE - [facility] 2-15    Patient Name: Marco A Steen  Date:2022  : 1985  [x]  Patient  Verified  Payor: Bhargavi Hoskins / Plan: [employer] PPO / Product Type: PPO /    In time: 9:15 am Out time:10:50 am   Total Treatment Time (min): 95  Total Timed Codes (min): 95  Visit #: 5    Treatment Area: Lymphedema, not elsewhere classified [I89.0]    SUBJECTIVE  Pain Level (0-10 scale): 0/10  Any medication changes, allergies to medications, adverse drug reactions, diagnosis change, or new procedure performed?: [] No    [x] Yes   Patient reports recent blood work revealed she has elevated liver enzymes so she will have additional testing to determine cause. Subjective functional status/changes:   [x] No changes reported  Patient reports there was a delay in the processing of her compression order. Patient going to be traveling to a wedding in [place] today and was able to verbalize understanding of the need to work on bandaging if needed on her trip since she has not received her new compression products and travel may increase her swelling. OBJECTIVE  95 min Manual Therapy    Kinesiotaping: Deferred      Manual Lymphatic Drainage (MLD):  Area to decongest: LE: bilateral   Sequence used and effectiveness: Modifications were made to manual lymph drainage sequence to exclude cervical techniques secondary to patient's age. Secondary sequence for lower extremities with trunk involvement. Encouraged Self MLD techniques with skin care. Patient has a Flexitouch Plus vasopneumatic device and is using the device 2 times a week. Discussed increasing use as tolerated. Skin/wound care/debridement: Reviewed skin care principles:   Skin care products  Hygiene  Prevention of cellulitis   Applied multi-layer compression bandaging to: Has supplies in the home setting, but is not bandaging on regular basis. Would benefit from bringing in her bandage supplies for review of proper techniques. Placed Comperm G to the R LE as patient has not been able to find her R LE older custom knee high and plans to travel later today. Upper/Lower Extremity Compression:   Patient arrived today with only one of her knee high garments in place. Patient has misplaced the other garment. She reports she has been wearing her compression chris more now that the weather is cooler. Measured for the following compression products on a previous visit. Patient reports that the vendor let her know the items will arrive in the next week. LE: bilateral bilateral lower extremity: Knee high Top band silicone border Closed toe Night garment    Style: Flat-knit and Foam based with adjustable panels. Brand: [employer]    Type: Custom: Flat knit knee highs and night products to the knee per patient request  Patient may also self purchase compression capris as she does not utilize them often. Vendor: [employer]    Education: Educated patient in compression garment donning and doffing. Glove use with donning. Daily wear schedule. Daily laundering. Garment lifespan. Return and reordering process (by bringing prior garments into the clinic). Educated patient to monitor for redness or pressure points on the skin. If new pain occurs they should contact their therapist.    Patient/family demonstrated donning and doffing with independence on prior visits with her products. Rationale: decrease edema  to improve the patients ability to prevent worsening swelling overtime. With   [] TE   [] TA   [x] MT   [] SC   [x] other: Patient Education: [x] Review HEP    [x] MLD Patient Education Continued education in self MLD technique with bathing and skin care. [x] Progressed/Changed HEP based on:   [x] positioning   [] Kinesiotape   [x] Skin care   [] wound care   [x] other: Garment ordering process. Vasopneumatic pump use/settings  [x]     Patient / caregiver re-demonstrated bandaging.  [x] Yes  [] No  Compression bandaging/garment precautions reviewed: [x] Yes  [] No     Other Objective/Functional Measures:     left lower 11,316. 59 ml today compared to 12,817.31 ml 10/17/2022 compared to 12,646.65 ml on 8/08/2022. right lower 12,357.36 ml today compared to 12,910.50 ml 10/17/2022 compared to 13,432.17 ml on 8/08/2022. Percent difference today is -8.42% as compared to -5.85% on evaluation. Height:  Height: 5' 7\" (170.2 cm)5'7\"  Weight:  Weight: 116.2 kg (256 lb 3.2 oz)   BMI:  BMI (calculated): 40.1  (36 or greater: adversely affecting lymphedema)     Pain Level (0-10 scale) post treatment: 0/10    ASSESSMENT/Changes in Function:   Patient making gains with reduction in her swelling, but has had delays in her compression garment order being completed. Patient should have new compressoin garments in place in the next week and is eager to begin utilizing them. Patient did report today that she had some blood work recently that revealed she had elevated liver enzymes so she will have additional tests for this to determine cause. Patient does feel that she has a better understanding of how to manage her lymphedema than she had in the past as she had difficulty with accepting that lymphedema is a chronic condition that will need to be managed life long. Patient feels that she can see improvement with her consistent home program performance. Patient will look into options like aquatic exercise that would assist her in more physical activity. Patient will return in 2-3 weeks and should have her new products by that time and began utilizing them so we can assess effectiveness and prepare for discharge to the restorative phase of care. Patient was able to meet short term goals 1+3. Patient will continue to benefit from skilled PT services to address swelling, analyze compression product fit and use, and to instruct in home lymphedema management program to attain remaining goals.      []  See Plan of Care  [x]  See progress note/recertification  []  See Discharge Summary         Progress towards goals / Updated goals:  GOALS     Short Term Goals: To be accomplished in 6 weeks:  1. Patient will demonstrate knowledge of signs/symptoms of infections/cellulitis and be independent in skin care to prevent cellulitis. Goal Met 8/22/2022  2. Patient will demonstrate independence in lymphedema home program of therapeutic exercises to improve circulation and decongest limb to improve ADLs. In progress. 3.  Patient will be measured for appropriate day and night compression garments to promote optimal swelling management and prevent worsening over time. Goal Met 8/22/2022     Long Term Goals: To be accomplished in 16 weeks:  1. Pt will show improvement in the Lymphedema Life Impact Scale by decreasing the score to 35% and thus allow improvement in patient's quality of life. In progress    2. Patient will be independent with don/doff of compression system and use in order to prevent reaccumulation of fluid at discharge. In progress    3. Patient will be independent with self multi-layer bandaging over top of foam based night compression garments for improved self management of swelling. In progress    4. Pt will be independent in self-MLD and show stable limb volumes showing decongestion and pt. ready for transition to independent restorative phase of lymphedema therapy.   In progress      PLAN  []  Upgrade activities as tolerated     [x]  Continue plan of care  []  Update interventions per flow sheet       []  Discharge due to:_  []  Other:_      Ander Zabala, PTA, CLT  11/11/2022  ANNITA LeonT, CLMIKHAIL

## 2022-11-21 NOTE — PROGRESS NOTES
[facility]  a part of [address] [place] [address]  [address], [address] [facility], [address]  Phone: [phone]  Fax: [phone]           Progress Note     Name: Joanne Silva   : 1985              MD: Rowdy Campbell NP     Treatment Diagnosis: Lymphedema, not elsewhere classified [I89.0]  Start of Care: 2022                      Visits from Start of Care: 5  Missed Visits: 1     Summary of Care:  Skin care, Self MLD, Manual lymphatic drainage, vasopneumatic pump use recommendations, exercise, compression garment use and measuring for new compression products. Assessment / Recommendations:   Patient making gains with reduction in her swelling, but has had delays in her compression garment order being completed. Patient should have new compressoin garments in place in the next week and is eager to begin utilizing them. Patient did report today that she had some blood work recently that revealed she had elevated liver enzymes so she will have additional tests for this to determine cause. Patient does feel that she has a better understanding of how to manage her lymphedema than she had in the past as she had difficulty with accepting that lymphedema is a chronic condition that will need to be managed life long. Patient feels that she can see improvement with her consistent home program performance. Patient will look into options like aquatic exercise that would assist her in more physical activity. Patient will return in 2-3 weeks and should have her new products by that time and began utilizing them so we can assess effectiveness and prepare for discharge to the restorative phase of care. Patient was able to meet short term goals 1+3. Patient will continue to benefit from skilled PT services to address swelling, analyze compression product fit and use, and to instruct in home lymphedema management program to attain remaining goals.         GOALS     Short Term Goals: To be accomplished in 6 weeks:  1. Patient will demonstrate knowledge of signs/symptoms of infections/cellulitis and be independent in skin care to prevent cellulitis. Status at last note/certification:met  Status at progress note: met  2. Patient will demonstrate independence in lymphedema home program of therapeutic exercises to improve circulation and decongest limb to improve ADLs. Status at last note/certification:not met  Status at progress note: not met in progress  3. Patient will be measured for appropriate day and night compression garments to promote optimal swelling management and prevent worsening over time. Status at last note/certification:met  Status at progress note: met        Long Term Goals: To be accomplished in 16 weeks:  1. Pt will show improvement in the Lymphedema Life Impact Scale by decreasing the score to 35% and thus allow improvement in patient's quality of life. Status at last note/certification:not met  Status at progress note: not met in progress  2. Patient will be independent with don/doff of compression system and use in order to prevent reaccumulation of fluid at discharge. Status at last note/certification:not met   Status at progress note: not met in progress  3. Patient will be independent with self multi-layer bandaging over top of foam based night compression garments for improved self management of swelling. Status at last note/certification:not met   Status at progress note: not met in progress  4. Pt will be independent in self-MLD and show stable limb volumes showing decongestion and pt. ready for transition to independent restorative phase of lymphedema therapy. Status at last note/certification:not met  Status at progress note: not met in progress     Other: Continue with current treatment following plan of care established on evaluation.  Assess new compression products when they arrive and transition to restorative phase of care when appropriate.          Ander Zabala, PTA, CLT 11/11/2022   Ulysses Faes, ANNITAT, CLT-MIMI

## 2022-12-02 ENCOUNTER — APPOINTMENT (OUTPATIENT)
Dept: PHYSICAL THERAPY | Age: 37
End: 2022-12-02
Payer: COMMERCIAL

## 2022-12-09 ENCOUNTER — HOSPITAL ENCOUNTER (OUTPATIENT)
Dept: PHYSICAL THERAPY | Age: 37
Discharge: HOME | End: 2022-12-09
Payer: COMMERCIAL

## 2022-12-09 PROCEDURE — 97140 MANUAL THERAPY 1/> REGIONS: CPT

## 2022-12-18 VITALS — WEIGHT: 256 LBS | HEIGHT: 67 IN | BODY MASS INDEX: 40.18 KG/M2

## 2022-12-19 NOTE — PROGRESS NOTES
LYMPHEDEMA PT DAILY TREATMENT NOTE - [facility] 2-15    Patient Name: Aguila Dietz  Date:2022  : 1985  [x]  Patient  Verified  Payor: Margarito Olsen / Plan: [employer] PPO / Product Type: PPO /    In time: 9:15 am Out time:10:50 am   Total Treatment Time (min): 95  Total Timed Codes (min): 95  Visit #: 6  Treatment Area: Lymphedema, not elsewhere classified [I89.0]    SUBJECTIVE  Pain Level (0-10 scale): 0/10  Any medication changes, allergies to medications, adverse drug reactions, diagnosis change, or new procedure performed?: [] No    [x] Yes   Patient reports she had follow up for her elevated liver enzymes. The specialist she saw wants her to lose weight and return in 6 month to reassess her liver enzymes. Patient reports that she will get a second option. Subjective functional status/changes:   [x] No changes reported  Patient reports that she was able to do well on her trip to [place] that she went on after last visit, because she utilized recommendations. Patient reports that she did not proceed with night time compression garment order to date. Patient did receive and wear in her new custom knee highs. OBJECTIVE  95 min Manual Therapy    Kinesiotaping: Deferred        Manual Lymphatic Drainage (MLD):  Area to decongest: LE: bilateral   Sequence used and effectiveness: Modifications were made to manual lymph drainage sequence to exclude cervical techniques secondary to patient's age. Secondary sequence for lower extremities with trunk involvement. Encouraged Self MLD techniques with skin care. Patient has a Flexitouch Plus vasopneumatic device and is using the device once a week consistently. Discussed increasing use as tolerated as once daily would be best. Time seems to be her biggest barrier for her to find time for use. Educated in different settings and offered to have [employer] come back out to reassess device and garments if needed.     Skin/wound care/debridement: Reviewed skin care principles:   Skin care products  Hygiene  Prevention of cellulitis   Applied multi-layer compression bandaging to: Has supplies in the home setting, but is not bandaging on regular basis. Would benefit from bringing in her bandage supplies for review of proper techniques and she agreed to do this next visit. Upper/Lower Extremity Compression:   Patient wore in her new custom knee highs and overall the fit was good. There was some looseness in the toe box and slight reductions in the some girths. Patient does report not washing daily so some of the loose material could be from this. Measurements taken and pictures taken to send to vendor to see if patient can get a remake or to have updated measurements for second set. Patient reports that she did not proceed with the night time garments due to conversation with vendor. Will reach out to vendor to clarify coverage for these products and if they can be approved in this calendar year see if they can reach out to patient to confirm. Original order:  LE: bilateral bilateral lower extremity: Knee high Top band silicone border Closed toe Night garment    Style: Flat-knit and Foam based with adjustable panels. Brand: [employer]    Type: Custom: Flat knit knee highs and night products to the knee per patient request  Patient may also self purchase compression capris as she does not utilize them often. Vendor: [employer]    Education: Educated patient in compression garment donning and doffing. Glove use with donning. Daily wear schedule. Daily laundering. Garment lifespan. Return and reordering process (by bringing prior garments into the clinic). Educated patient to monitor for redness or pressure points on the skin.   If new pain occurs they should contact their therapist.    Patient/family demonstrated donning and doffing with independence     Rationale: decrease edema  to improve the patients ability to prevent worsening swelling overtime. With   [] TE   [] TA   [x] MT   [] SC   [x] other: Patient Education: [x] Review HEP (patient is independent with her exercises)    [x] MLD Patient Education Continued education in self MLD technique with bathing and skin care. [x] Progressed/Changed HEP based on:   [x] positioning   [] Kinesiotape   [x] Skin care   [] wound care   [x] other: Garment ordering process. Vasopneumatic pump use/settings    Patient / caregiver re-demonstrated bandaging. [x] Yes  [] No  Compression bandaging/garment precautions reviewed: [x] Yes  [] No     Other Objective/Functional Measures:     left lower 12.799.92 ml today compared to 11,316. 59 ml 11/11/2022 compared to 12,817.31 ml 10/17/2022 compared to 12,646.65 ml on 8/08/2022. right lower 13,781.71 ml today compared to 12,357.36 ml 11/11/2022 compared to 12,910.50 ml 10/17/2022 compared to 13,432.17 ml on 8/08/2022. Percent difference today is -7.12% as compared to -5.85% on evaluation. Height:  Height: 5' 7\" (170.2 cm)5'7\"  Weight:  Weight: 116.1 kg (256 lb)   BMI:  BMI (calculated): 40.1  (36 or greater: adversely affecting lymphedema)     Lymphedema Life Impact Scale: Patient scored a 24 out of 68 on the scale today, which correlates with a 35% impairment. Pain Level (0-10 scale) post treatment: 0/10    ASSESSMENT/Changes in Function:   Patient returns today with her new custom knee highs in place for assessment. Patient has elevated volumes overall from not wearing compression chris over knee highs regularly as recommended and not utilizing her advanced vasopneumatic device daily as recommended. Patient has a better understanding of how to manage her lymphedema than she had in the past and is coming to terms with dealing with a chronic condition,which has been challenging for her.  Patient struggles with timing to get all aspects of her home program done daily and is working on being more intentional and consistent. Patient will look into options like aquatic exercise that would assist her in more physical activity. Patient will return in 2-3 weeks and should have her remade/second set of products by that time and began utilizing them so we can assess effectiveness and prepare for discharge to the restorative phase of care. Patient was able to meet short term goals 1+3 and long term goals 1+2. Patient will continue to benefit from skilled PT services to address swelling, analyze compression product fit and use, and to instruct in home lymphedema management program to attain remaining goals and transition to the restorative phase of care when appropriate. []  See Plan of Care  [x]  See progress note/recertification  []  See Discharge Summary         Progress towards goals / Updated goals:  GOALS     Short Term Goals: To be accomplished in 6 weeks:  1. Patient will demonstrate knowledge of signs/symptoms of infections/cellulitis and be independent in skin care to prevent cellulitis. Goal Met 8/22/2022  2. Patient will demonstrate independence in lymphedema home program of therapeutic exercises to improve circulation and decongest limb to improve ADLs. Goal Met 12/09/2022  3. Patient will be measured for appropriate day and night compression garments to promote optimal swelling management and prevent worsening over time. Goal Met 8/22/2022     Long Term Goals: To be accomplished in 16 weeks:  1. Pt will show improvement in the Lymphedema Life Impact Scale by decreasing the score to 35% and thus allow improvement in patient's quality of life. Goal Met 12/09/2022   2. Patient will be independent with don/doff of compression system and use in order to prevent reaccumulation of fluid at discharge. Goal Met 12/09/2022   3. Patient will be independent with self multi-layer bandaging over top of foam based night compression garments for improved self management of swelling. In progress.  Patient to bring in bandage materials next visit to go over techniques again. 4.  Pt will be independent in self-MLD and show stable limb volumes showing decongestion and pt. ready for transition to independent restorative phase of lymphedema therapy.   In progress      PLAN  []  Upgrade activities as tolerated     [x]  Continue plan of care  []  Update interventions per flow sheet       []  Discharge due to:_  []  Other:_      Ander Zabala, PTA, CLT  12/09/2022  POOJA Schroeder, ADARSH

## 2022-12-20 NOTE — PROGRESS NOTES
[facility]  a [facility] of [facility]  [address], [address] [facility], [facility], [address]  Phone: [phone]  Fax: [phone]     Progress Note     Name: Claus Alfonso   : 1985              MD: Jasiel Swift, NP  Treatment Diagnosis: Lymphedema, not elsewhere classified [I89.0]  Start of Care: 2022                      Visits from Start of Care: 6  Missed Visits: 2     Summary of Care:Skin care, Self MLD, Manual lymphatic drainage, vasopneumatic pump use recommendations, exercise, compression garment use and measuring/fitting new compression products. Assessment / Recommendations:    Patient returns today with her new custom knee highs in place for assessment. Patient has elevated volumes overall from not wearing compression chris over knee highs regularly as recommended and not utilizing her advanced vasopneumatic device daily as recommended. Patient has a better understanding of how to manage her lymphedema than she had in the past and is coming to terms with dealing with a chronic condition,which has been challenging for her. Patient struggles with timing to get all aspects of her home program done daily and is working on being more intentional and consistent. Patient will look into options like aquatic exercise that would assist her in more physical activity. Patient will return in 2-3 weeks and should have her remade/second set of products by that time and began utilizing them so we can assess effectiveness and prepare for discharge to the restorative phase of care. Patient was able to meet short term goals 1+3 and long term goals 1+2. Patient will continue to benefit from skilled PT services to address swelling, analyze compression product fit and use, and to instruct in home lymphedema management program to attain remaining goals and transition to the restorative phase of care when appropriate. GOALS     Short Term Goals:  To be accomplished in 6 weeks:  1. Patient will demonstrate knowledge of signs/symptoms of infections/cellulitis and be independent in skin care to prevent cellulitis. Status at last note/certification:met  Status at progress note: met  2. Patient will demonstrate independence in lymphedema home program of therapeutic exercises to improve circulation and decongest limb to improve ADLs. Status at last note/certification:not met in progress   Status at progress note:met   3. Patient will be measured for appropriate day and night compression garments to promote optimal swelling management and prevent worsening over time. Status at last note/certification:met  Status at progress note: met     Long Term Goals: To be accomplished in 16 weeks:  1. Pt will show improvement in the Lymphedema Life Impact Scale by decreasing the score to 35% and thus allow improvement in patient's quality of life. Status at last note/certification:not met in progress  Status at progress note: met   2. Patient will be independent with don/doff of compression system and use in order to prevent reaccumulation of fluid at discharge. Status at last note/certification:not met in progress  Status at progress note:met   3. Patient will be independent with self multi-layer bandaging over top of foam based night compression garments for improved self management of swelling. Status at last note/certification:not met in progress  Status at progress note: not met in progress  4. Pt will be independent in self-MLD and show stable limb volumes showing decongestion and pt. ready for transition to independent restorative phase of lymphedema therapy. Status at last note/certification:not met in progress  Status at progress note: not met in progress     Other: Continue to work towards remaining goals and prepare for transition to the restorative phase of care when appropriate.          Ander Zabala, PTA, CLT 12/09/2022   Flavia Humphreys, MSPT, CLT-MIMI

## 2022-12-29 ENCOUNTER — HOSPITAL ENCOUNTER (OUTPATIENT)
Dept: PHYSICAL THERAPY | Age: 37
End: 2022-12-29
Payer: COMMERCIAL

## 2022-12-29 PROCEDURE — 97140 MANUAL THERAPY 1/> REGIONS: CPT

## 2022-12-31 VITALS — HEIGHT: 67 IN | WEIGHT: 255.6 LBS | BODY MASS INDEX: 40.12 KG/M2

## 2022-12-31 NOTE — PROGRESS NOTES
LYMPHEDEMA PT DAILY TREATMENT NOTE - [facility] 2-15    Patient Name: Wolfgang Davis  Date:2022  : 1985  [x]  Patient  Verified  Payor: Laureen Pittman / Plan: Delia Torres PPO / Product Type: PPO /    In time: 9:15 am Out time:10:45 am   Total Treatment Time (min): 90  Total Timed Codes (min): 90   Visit #: 7  Treatment Area: Lymphedema, not elsewhere classified [I89.0]    SUBJECTIVE  Pain Level (0-10 scale): 0/10  Any medication changes, allergies to medications, adverse drug reactions, diagnosis change, or new procedure performed?: [x] No    [] Yes   Subjective functional status/changes:   [x] No changes reported  Patient returns today and reports that she has increased to twice a week use of her vasopneumatic device. Patient with better understanding of her home program and importance of utilizing her compression products. Patient reports that she is also utilizing her compression chris more often. OBJECTIVE  90 min Manual Therapy    Kinesiotaping: Deferred        Manual Lymphatic Drainage (MLD):  Area to decongest: LE: bilateral   Sequence used and effectiveness: Modifications were made to manual lymph drainage sequence to exclude cervical techniques secondary to patient's age. Secondary sequence for lower extremities with trunk involvement. Focused on trunk and R LE today. Encouraged Self MLD techniques with skin care. Patient has a Flexitouch Plus vasopneumatic device and is using the device twice a week consistently. Discussed increasing use as tolerated as once daily would be best for her long term management of her lymphedema. Time seems to be her biggest barrier for her to find time for use. Educated in different settings and offered to have [employer] come back out to reassess device and garments if needed. Skin/wound care/debridement: Reviewed skin care principles:   Skin care products  Hygiene  Prevention of cellulitis   Applied multi-layer compression bandaging to:  Has supplies in the home setting, but is not bandaging on regular basis. Patient did not bring in her bandage supplies today for more education in self bandaging. Able to utilize practice bandage materials today in clinic to demonstrate techniques for bandaging and patient able to demonstrate good technique with self bandaging after demonstration. Encouraged patient to work on self bandaging on a regular basis as part of her home program until she is able to obtain night time compression products. Upper/Lower Extremity Compression:   Patient wore in her new custom knee highs last visit and overall the fit was good. There was some looseness in the toe box and slight reductions in the some girths, but patient did report not washing daily so some of the loose material could be from this. Measurements taken and pictures taken to send to vendor to see if patient can get a remake or to have updated measurements for second set. She has not received the remakes/second set to date, but is in touch with vendor. The changes are very slight so there should be no fit issues when she does receive them, but she will contact vendor if she does. Patient did not want to purchase night time garments at this time. Original order:  LE: bilateral bilateral lower extremity: Knee high Top band silicone border Closed toe and     Style: Flat-knit     Brand: Katelin Steinberggildardo Schmid    Type: Custom: Flat knit knee highs and night products to the knee per patient request  Patient may also self purchase compression capris as she does not utilize them often. Vendor: [employer]    Education: Educated patient in compression garment donning and doffing. Glove use with donning. Daily wear schedule. Daily laundering. Garment lifespan. Return and reordering process (by bringing prior garments into the clinic). Educated patient to monitor for redness or pressure points on the skin.   If new pain occurs they should contact their therapist.    Patient/family demonstrated donning and doffing with independence     Rationale: decrease edema  to improve the patients ability to prevent worsening swelling overtime. With   [] TE   [] TA   [x] MT   [] SC   [x] other: Patient Education: [x] Review HEP (patient is independent with her exercises)    [x] MLD Patient Education Continued education in self MLD technique with bathing and skin care. [x] Progressed/Changed HEP based on:   [x] positioning   [] Kinesiotape   [x] Skin care   [] wound care   [x] other: Garment ordering process. Vasopneumatic pump use/settings  Patient / caregiver re-demonstrated bandaging. [x] Yes  [] No  Compression bandaging/garment precautions reviewed: [x] Yes  [] No     Other Objective/Functional Measures:   left lower 11,913.36 ml today compared to 12.799.92 ml 12/09/2022 compared to 11,316. 59 ml 11/11/2022 compared to 12,817.31 ml 10/17/2022 compared to 12,646.65 ml on 8/08/2022. right lower 13,308.26 ml today compared to 13,781.71 ml  12/09/2022 compared to 12,357.36 ml 11/11/2022 compared to 12,910.50 ml 10/17/2022 compared to 13,432.17 ml on 8/08/2022. Percent difference today is -10.48% as compared to -5.85% on evaluation. Girths:  Waist: 110 cm   Hips:127 cm     Height:  Height: 5' 7\" (170.2 cm)5'7\"  Weight:  Weight: 115.9 kg (255 lb 9.6 oz)   BMI:  BMI (calculated): 40  (36 or greater: adversely affecting lymphedema)     Lymphedema Life Impact Scale: Patient scored a 27 out of 68 on the scale today, which correlates with a 40% impairment. This score is up from last visit (22 out of 68 with 35% impairment)     Pain Level (0-10 scale) post treatment: 0/10    ASSESSMENT/Changes in Function:   Patient returns today with improved reduction in volumes in B LEs.  Patient was also able to demonstrate that she can self bandage to utilize this treatment tool as needed in the home setting, since she is not going to proceed with night time garments at this time. Patient is awaiting her remake/second set of knee highs, but the changes that were made were slight so there should be no concerns with fit when she does receive them and she will contact vendor if she does have concerns. Patient does feel that she has a better understanding of her home program and more confident in managing her swelling during the self management phase. Patient has met all goals and is ready for discharge to the restorative phase of care today with follow up in 6 months.     []  See Plan of Care  []  See progress note/recertification  [x]  See Discharge Summary         Progress towards goals / Updated goals:  GOALS     Short Term Goals: To be accomplished in 6 weeks:  1. Patient will demonstrate knowledge of signs/symptoms of infections/cellulitis and be independent in skin care to prevent cellulitis. Goal Met 8/22/2022  2. Patient will demonstrate independence in lymphedema home program of therapeutic exercises to improve circulation and decongest limb to improve ADLs. Goal Met 12/09/2022  3. Patient will be measured for appropriate day and night compression garments to promote optimal swelling management and prevent worsening over time. Goal Met 8/22/2022     Long Term Goals: To be accomplished in 16 weeks:  1. Pt will show improvement in the Lymphedema Life Impact Scale by decreasing the score to 35% and thus allow improvement in patient's quality of life. Goal Met 12/09/2022   2. Patient will be independent with don/doff of compression system and use in order to prevent reaccumulation of fluid at discharge. Goal Met 12/09/2022   3. Patient will be independent with self multi-layer bandaging over top of foam based night compression garments for improved self management of swelling. Goal Met 12/29/2022  4. Pt will be independent in self-MLD and show stable limb volumes showing decongestion and pt. ready for transition to independent restorative phase of lymphedema therapy. Goal Met 12/29/2022      PLAN  []  Upgrade activities as tolerated     []  Continue plan of care  []  Update interventions per flow sheet       [x]  Discharge due to:goals met  [x]  Other:Patient would like to try a different vendor for her next compression order.       Ander Zabala, PTA, CLT  12/29/2022  ANNITA LuceroT, CLT-MIMI

## 2023-01-03 NOTE — PROGRESS NOTES
Outpatient Lymphedema,   a part of [address] [place] [address], [address] [facility], [facility]n  Phone: [phone] Fax: [phone]        Discharge Summary 2-15     Patient name: Chela Rodriguez                  : 1985                        Provider#: [id number]  Referral source: Ellsworth Councilman, NP                                       Medical/Treatment Diagnosis: Lymphedema, not elsewhere classified [I89.0]                               Prior Hospitalization: see medical history                             Comorbidities: See Plan of Care  Prior Level of Function: See Plan of Care  Medications: Verified on Patient Summary List     Start of Care: 7                                                                                  Onset Date:10/21/2022              Visits from Start of Care: 7                                                  Missed Visits: 2  Reporting Period : 2022 to 2022     Assessment/Summary of care:   Patient returns today with reduction in volumes in B LEs. Patient was also able to demonstrate that she can self bandage to utilize this as needed in the home setting, since she is not going to proceed with night time garments at this time. Patient is awaiting her remake/second set of knee highs, but the changes that were made were slight so there should be no concerns with fit when she does receive them and she will contact vendor if she. Patient does feel that she has a better understanding of her home program and more confident in managing her swelling during the self management phase. Patient has met all goals and is ready for discharge to the restorative phase of care today with follow up in 6 months. Short Term Goals: To be accomplished in 6 weeks:  1. Patient will demonstrate knowledge of signs/symptoms of infections/cellulitis and be independent in skin care to prevent cellulitis. Goal Met 2022  2.   Patient will demonstrate independence in lymphedema home program of therapeutic exercises to improve circulation and decongest limb to improve ADLs. Goal Met 12/09/2022  3. Patient will be measured for appropriate day and night compression garments to promote optimal swelling management and prevent worsening over time. Goal Met 8/22/2022     Long Term Goals: To be accomplished in 16 weeks:  1. Pt will show improvement in the Lymphedema Life Impact Scale by decreasing the score to 35% and thus allow improvement in patient's quality of life. Goal Met 12/09/2022   2. Patient will be independent with don/doff of compression system and use in order to prevent reaccumulation of fluid at discharge. Goal Met 12/09/2022   3. Patient will be independent with self multi-layer bandaging over top of foam based night compression garments for improved self management of swelling. Goal Met 12/29/2022  4. Pt will be independent in self-MLD and show stable limb volumes showing decongestion and pt. ready for transition to independent restorative phase of lymphedema therapy. Goal Met 12/29/2022        RECOMMENDATIONS:  []Discontinue therapy:           [x]Patient has reached or is progressing toward set goals                                                  []Patient is non-compliant or has abdicated                                            []Due to lack of appreciable progress towards set goals                                            []July Zabala PTA, CLT 12/29/2022  POOJA Shafer, CLMIKHAIL

## 2023-04-21 DIAGNOSIS — M79.621 PAIN IN RIGHT AXILLA: Primary | ICD-10-CM

## 2023-05-20 RX ORDER — TRIAMCINOLONE ACETONIDE 55 UG/1
2 SPRAY, METERED NASAL DAILY
COMMUNITY

## 2023-05-20 RX ORDER — FLUTICASONE PROPIONATE 50 MCG
2 SPRAY, SUSPENSION (ML) NASAL DAILY
COMMUNITY
Start: 2011-04-19

## 2023-05-20 RX ORDER — IBUPROFEN 200 MG
TABLET ORAL
COMMUNITY

## 2023-05-20 RX ORDER — MECLIZINE HYDROCHLORIDE 25 MG/1
25 TABLET ORAL 3 TIMES DAILY PRN
COMMUNITY
Start: 2011-04-19

## 2023-05-20 RX ORDER — COVID-19 ANTIGEN TEST
KIT MISCELLANEOUS
COMMUNITY

## 2023-06-29 ENCOUNTER — HOSPITAL ENCOUNTER (OUTPATIENT)
Facility: HOSPITAL | Age: 38
Setting detail: RECURRING SERIES
End: 2023-06-29
Payer: COMMERCIAL

## 2023-06-29 ENCOUNTER — APPOINTMENT (OUTPATIENT)
Dept: PHYSICAL THERAPY | Age: 38
End: 2023-06-29

## 2023-06-29 VITALS — WEIGHT: 246.2 LBS | HEIGHT: 67 IN | BODY MASS INDEX: 38.64 KG/M2

## 2023-06-29 PROCEDURE — 97161 PT EVAL LOW COMPLEX 20 MIN: CPT

## 2023-06-29 PROCEDURE — 97760 ORTHOTIC MGMT&TRAING 1ST ENC: CPT

## 2023-09-27 ENCOUNTER — HOSPITAL ENCOUNTER (OUTPATIENT)
Facility: HOSPITAL | Age: 38
Setting detail: RECURRING SERIES
Discharge: HOME | End: 2023-09-30
Payer: COMMERCIAL

## 2023-09-27 PROCEDURE — 97535 SELF CARE MNGMENT TRAINING: CPT

## 2023-09-27 PROCEDURE — 97763 ORTHC/PROSTC MGMT SBSQ ENC: CPT

## 2023-09-27 PROCEDURE — 97016 VASOPNEUMATIC DEVICE THERAPY: CPT

## 2023-09-28 NOTE — PROGRESS NOTES
PHYSICAL THERAPY/OCCUPATIONAL THERAPY - DAILY TREATMENT NOTE (updated 3/23)      Date: 2023          Patient Name:  Sarah Wright :  1985   Medical   Diagnosis:  Lymphedema, not elsewhere classified [I89.0] Treatment Diagnosis:  I89.0     Lymphedema, not elsewhere classified    Referral Source:  ARISTEO Hughes - * Insurance:   Payor: Ramakrishna Miller / Plan: Ramakrishna Miller PPO / Product Type: *No Product type* /                     Patient  verified yes     Visit #   Current  / Total 2 4   Time   In / Out 10:30 AM 11:30 AM   Total Treatment Time 60   Total Timed Codes 60         SUBJECTIVE    Pain Level (0-10 scale): 0/10    Any medication changes, allergies to medications, adverse drug reactions, diagnosis change, or new procedure performed?: [x] No    [] Yes (see summary sheet for update)  Medications: Verified on Patient Summary List    Subjective functional status/changes:     Patient reports it took her a long time to get her compression garment order done due to having communication issues with the vendor. Patient arrived today with her [employer] night time garments to be assessed,  She has been wearing them, but had questions in improving donning/doffing. Patient did not receive the rest of her compression order so vendor was called by clinic staff to assist with getting remaining items ordered. Patient requested at this time she did not want to get the additional compression knee highs, just the toe cap for the L foot. OBJECTIVE    Therapeutic Procedures: Tx Min Billable or 1:1 Min (if diff from Tx Min) Procedure, Rationale, Specifics   20 20 [id number] Orthotic Management and Training LE (timed): improve positioning of lower extremity during weight bearing and gait, improve pressure distrubution of the plantar aspect of the foot to improve patient's ability to progress to PLOF and address remaining functional goals.     Details if applicable:    Fitted for the following compression products:

## 2024-07-12 ENCOUNTER — TRANSCRIBE ORDERS (OUTPATIENT)
Facility: HOSPITAL | Age: 39
End: 2024-07-12

## 2024-07-12 DIAGNOSIS — I89.0 LYMPHEDEMA: Primary | ICD-10-CM

## 2024-07-16 ENCOUNTER — HOSPITAL ENCOUNTER (OUTPATIENT)
Facility: HOSPITAL | Age: 39
Setting detail: RECURRING SERIES
Discharge: HOME | End: 2024-07-19
Payer: COMMERCIAL

## 2024-07-16 VITALS — WEIGHT: 254.8 LBS | BODY MASS INDEX: 39.99 KG/M2 | HEIGHT: 67 IN

## 2024-07-16 PROCEDURE — 97162 PT EVAL MOD COMPLEX 30 MIN: CPT

## 2024-07-16 NOTE — THERAPY EVALUATION
be measured for and obtain comfortable and optimal fitting compression products to prevent reaccumulation of fluid at discharge which could impair patient's ability to perform safe mobility and dressing.  2.Patient/caregiver will demonstrate improved edema management as evidenced by performing donning/doffing of garments modified independent 3/3 times within session to aid in reducing risk for infection and promote transition to maintenance phase of CDT.      Frequency / Duration: Patient to be seen 1 times every 2-6 weeks dependent on compression garment delivery for 5 treatments    Patient/ Caregiver education and instruction: Diagnosis, prognosis, self care and other compression garment  ordering process [x]  Plan of care has been reviewed with IWONA Mcfadden, CLT-ALEKSANDAR      7/16/2024       3:17 PM        ===================================================================  I certify that the above Therapy Services are being furnished while the patient is under my care. I agree with the treatment plan and certify that this therapy is necessary.    Physician's Signature:_________________________   DATE:_________   TIME:________                           Yeong, Kelly L, ARISTEO - *    ** Signature, Date and Time must be completed for valid certification **  Please sign and fax to [phone].  Thank you

## 2024-07-29 ENCOUNTER — HOSPITAL ENCOUNTER (OUTPATIENT)
Facility: HOSPITAL | Age: 39
Setting detail: RECURRING SERIES
Discharge: HOME | End: 2024-08-01
Payer: COMMERCIAL

## 2024-07-29 PROCEDURE — 97760 ORTHOTIC MGMT&TRAING 1ST ENC: CPT

## 2024-07-29 NOTE — PROGRESS NOTES
[facility]  a part of [facility]   [address], [address]  [place] [address]  Phone: [phone]  Fax: [phone]    PHYSICAL THERAPY/OCCUPATIONAL THERAPY PROGRESS NOTE  Patient Name:  Aracelis Dsouza :  1985   Treatment/Medical Diagnosis: Lymphedema, not elsewhere classified [I89.0]   Referral Source:  Yeong, Kelly L, ARISTEO - *     Date of Initial Visit:  2024 Attended Visits:  2 Missed Visits:  0     SUMMARY OF TREATMENT/ASSESSMENT:     Patient was seen last session for evaluation only per insurance requirements and she returns today for compression garment measuring and ordering. Therapist proceeded to measure patient for custom day and night compression garments for bilateral lower extremities.  Patient will pursue bike short options in the form of shape wear or mild compression support.  Patient to call upon garment delivery and she will return to the clinic for a fitting if fit issues are noted.  Reinforced that she has 30 days for remakes if fit issues are present.    Patient will continue to benefit from skilled PT / OT services to address swelling, analyze compression product fit and use, instruct in home lymphedema management program, and measure for compression products to address functional deficits and attain remaining goals.    CURRENT STATUS/GOALS  Short Term Goals: To be accomplished in 5  treatments    1. Patient will be measured for and obtain comfortable and optimal fitting compression products to prevent reaccumulation of fluid at discharge which could impair patient's ability to perform safe mobility and dressing. Measured for day and night products today.    Status at last Eval/Progress Note: initiated  Current Status: in progress  Goal Met?  no      2.Patient/caregiver will demonstrate improved edema management as evidenced by performing donning/doffing of garments modified independent 3/3 times within session to aid in reducing risk

## 2024-07-29 NOTE — PROGRESS NOTES
PHYSICAL THERAPY/OCCUPATIONAL THERAPY - DAILY TREATMENT NOTE (updated 3/23)      Date: 2024          Patient Name:  Aracelis Dsouza :  1985   Medical   Diagnosis:  Lymphedema, not elsewhere classified [I89.0] Treatment Diagnosis:  I89.0     Lymphedema, not elsewhere classified    Referral Source:  Yeong, Kelly L, APRN - * Insurance:   Payor: [facility] / Plan: [facility] [employer] / Product Type: *No Product type* /                     Patient  verified yes     Visit #   Current  / Total 2 5   Time   In / Out 1205 1306   Total Treatment Time 61   Total Timed Codes 61         SUBJECTIVE    Pain Level (0-10 scale): 0 out of 10 numeric scale    Any medication changes, allergies to medications, adverse drug reactions, diagnosis change, or new procedure performed?: [x] No    [] Yes (see summary sheet for update)  Medications: Verified on Patient Summary List    Subjective functional status/changes:     Patient returns for her first treatment visit after her insurance only allowed an evaluation last session.  She request to be measured for  bilateral thigh length garments for day and night today.    OBJECTIVE      Therapeutic Procedures:  Tx Min Billable or 1:1 Min (if diff from Tx Min) Procedure, Rationale, Specifics   59 59 [id number] Orthotic Management and Training UE, LE and/or trunk, initial orthotic(s) encounter (timed): assessment and fitting to improve positioning of lower extremity during weight bearing and gait, improve pressure distribution of the plantar aspect of the foot, improve upper extremity performance, improve postural stability to improve patient's ability to progress to PLOF and address remaining functional goals.    Details if applicable:    Upper/Lower Extremity Compression: Measured for the following compression products:    LE Bilateral Thigh high and Night Garment    Style: Flat knit and Foam based    Brand: [employer]    Type: Custom: Flat knit thighs  bilaterally with